# Patient Record
Sex: FEMALE | Race: WHITE | NOT HISPANIC OR LATINO | Employment: UNEMPLOYED | URBAN - METROPOLITAN AREA
[De-identification: names, ages, dates, MRNs, and addresses within clinical notes are randomized per-mention and may not be internally consistent; named-entity substitution may affect disease eponyms.]

---

## 2020-07-08 ENCOUNTER — OFFICE VISIT (OUTPATIENT)
Dept: INTERNAL MEDICINE | Facility: CLINIC | Age: 50
End: 2020-07-08
Payer: MEDICARE

## 2020-07-08 VITALS
SYSTOLIC BLOOD PRESSURE: 120 MMHG | HEIGHT: 64 IN | WEIGHT: 115 LBS | HEART RATE: 81 BPM | BODY MASS INDEX: 19.63 KG/M2 | DIASTOLIC BLOOD PRESSURE: 71 MMHG | RESPIRATION RATE: 16 BRPM | OXYGEN SATURATION: 98 % | TEMPERATURE: 97 F

## 2020-07-08 DIAGNOSIS — Z12.31 BREAST CANCER SCREENING BY MAMMOGRAM: ICD-10-CM

## 2020-07-08 DIAGNOSIS — Z12.4 PAP SMEAR FOR CERVICAL CANCER SCREENING: ICD-10-CM

## 2020-07-08 DIAGNOSIS — Z12.11 SCREENING FOR COLON CANCER: ICD-10-CM

## 2020-07-08 DIAGNOSIS — Z76.89 ENCOUNTER TO ESTABLISH CARE: Primary | ICD-10-CM

## 2020-07-08 PROCEDURE — 99203 PR OFFICE/OUTPT VISIT, NEW, LEVL III, 30-44 MIN: ICD-10-PCS | Mod: S$GLB,,, | Performed by: NURSE PRACTITIONER

## 2020-07-08 PROCEDURE — 99203 OFFICE O/P NEW LOW 30 MIN: CPT | Mod: S$GLB,,, | Performed by: NURSE PRACTITIONER

## 2020-07-08 NOTE — PROGRESS NOTES
Clinic Note  7/8/2020      Subjective:       Patient ID:  Keshia is a 50 y.o. female being seen for a new visit.      Chief Complaint: Establish Care (wants order for Colonscopy and Mammogram.)    HPI  Keshia is a 50 year old female in clinic today to establish care with PCP. States that it has been several years since she has had a PCP, moved back to [place], [place] a few years ago. Brother recently diagnosed with cancer, she is now wanting to have mammogram, colon cancer screening. She is in need of annual lab work up as well. Anxiety, chronic, controlled with counseling, breathing techniques, and meditation. Insomnia, chronic, treated with OTC sleep aid, Unisom.    The following portions of the patient's history were reviewed and updated as appropriate: allergies, current medications, past family history, past medical history, past social history, past surgical history and problem list.      Family History   Problem Relation Age of Onset    Cancer Father     Cancer Brother     Asthma Brother     Cancer Paternal Grandmother      Social History     Socioeconomic History    Marital status:      Spouse name: Not on file    Number of children: Not on file    Years of education: Not on file    Highest education level: Not on file   Occupational History    Not on file   Social Needs    Financial resource strain: Not on file    Food insecurity     Worry: Not on file     Inability: Not on file    Transportation needs     Medical: Not on file     Non-medical: Not on file   Tobacco Use    Smoking status: Current Every Day Smoker     Types: Cigarettes    Smokeless tobacco: Never Used   Substance and Sexual Activity    Alcohol use: Not on file    Drug use: Not on file    Sexual activity: Not on file   Lifestyle    Physical activity     Days per week: Not on file     Minutes per session: Not on file    Stress: Not on file   Relationships    Social connections     Talks on phone: Not on file      "Gets together: Not on file     Attends Congregational service: Not on file     Active member of club or organization: Not on file     Attends meetings of clubs or organizations: Not on file     Relationship status: Not on file   Other Topics Concern    Not on file   Social History Narrative    Not on file     Past Surgical History:   Procedure Laterality Date    APPENDECTOMY       There is no problem list on file for this patient.      Review of Systems   Constitutional: Negative for chills, fever, malaise/fatigue and weight loss.   HENT: Negative for ear pain, nosebleeds, sore throat and tinnitus.    Respiratory: Negative for cough, sputum production, shortness of breath and wheezing.    Cardiovascular: Negative for chest pain, palpitations, orthopnea and claudication.   Gastrointestinal: Negative for constipation, diarrhea, heartburn, nausea and vomiting.   Genitourinary: Negative for dysuria, frequency, hematuria and urgency.   Musculoskeletal: Negative for back pain, joint pain, myalgias and neck pain.   Psychiatric/Behavioral: Negative for depression and suicidal ideas. The patient is nervous/anxious and has insomnia.            Objective:      /71 (BP Location: Left arm, Patient Position: Sitting, BP Method: Medium (Automatic))   Pulse 81   Temp 97 °F (36.1 °C) (Temporal)   Resp 16   Ht 5' 4" (1.626 m)   Wt 52.2 kg (115 lb)   LMP 06/24/2020   SpO2 98%   BMI 19.74 kg/m²   Estimated body mass index is 19.74 kg/m² as calculated from the following:    Height as of this encounter: 5' 4" (1.626 m).    Weight as of this encounter: 52.2 kg (115 lb).  Physical Exam   Constitutional: She is oriented to person, place, and time and well-developed, well-nourished, and in no distress. Vital signs are normal. She appears not dehydrated. She appears healthy. She does not have a sickly appearance. No distress.   HENT:   Head: Normocephalic and atraumatic.   Right Ear: Hearing, tympanic membrane, external ear and " ear canal normal. No swelling. No decreased hearing is noted.   Left Ear: Hearing, tympanic membrane, external ear and ear canal normal. No swelling. No decreased hearing is noted.   Nose: Nose normal. No mucosal edema or rhinorrhea.   Mouth/Throat: Uvula is midline and mucous membranes are normal. Mucous membranes are not pale and not dry. No oropharyngeal exudate, posterior oropharyngeal edema or posterior oropharyngeal erythema.   Eyes: Pupils are equal, round, and reactive to light. Conjunctivae are normal. Right eye exhibits no discharge. Left eye exhibits no discharge.   Neck: Trachea normal and normal range of motion. No JVD present. Normal range of motion present. No thyromegaly present.   Cardiovascular: Normal rate, regular rhythm and normal heart sounds. Exam reveals no friction rub.   No murmur heard.  Pulmonary/Chest: Effort normal and breath sounds normal. No respiratory distress. She has no wheezes.   Abdominal: Soft. Bowel sounds are normal. She exhibits no distension. There is abdominal tenderness. There is no rebound.   Musculoskeletal: Normal range of motion.         General: No tenderness, deformity or edema.   Neurological: She is alert and oriented to person, place, and time. Gait normal. GCS score is 15.   Skin: Skin is warm and dry. She is not diaphoretic.   Psychiatric: Mood, memory, affect and judgment normal.   Nursing note and vitals reviewed.        Assessment and Plan:           Problem List Items Addressed This Visit     None      Visit Diagnoses     Encounter to establish care    -  Primary    Relevant Orders    CBC auto differential    Lipid Panel    Comprehensive metabolic panel    TSH w/reflex to FT4    Breast cancer screening by mammogram        Relevant Orders    Mammo Digital Screening Bilat    Screening for colon cancer        Relevant Orders    Ambulatory referral/consult to General Surgery    Pap smear for cervical cancer screening        Relevant Orders    Ambulatory  referral/consult to Obstetrics / Gynecology          Risks, benefits, and alternatives discussed with patient, Patient verbalized understanding of discussed plan of care. Asked patient if any further questions, answered no.  Follow up:   Follow up in about 12 weeks (around 9/30/2020), or if symptoms worsen or fail to improve.     Other Orders Placed This Visit:  Orders Placed This Encounter   Procedures    Mammo Digital Screening Bilat     Standing Status:   Future     Number of Occurrences:   1     Standing Expiration Date:   9/8/2021     Scheduling Instructions:      [address]     Order Specific Question:   Is the patient pregnant?     Answer:   No     Order Specific Question:   Has patient had radiation?     Answer:   No     Order Specific Question:   Has the patient had chemotherapy?     Answer:   No     Order Specific Question:   May the Radiologist modify the order per protocol to meet the clinical needs of the patient?     Answer:   Yes     Order Specific Question:   Does the patient have breast implants?     Answer:   No    CBC auto differential     Standing Status:   Future     Number of Occurrences:   1     Standing Expiration Date:   9/6/2021    Lipid Panel     Standing Status:   Future     Number of Occurrences:   1     Standing Expiration Date:   9/8/2021    Comprehensive metabolic panel     Standing Status:   Future     Number of Occurrences:   1     Standing Expiration Date:   9/6/2021    TSH w/reflex to FT4     Standing Status:   Future     Number of Occurrences:   1     Standing Expiration Date:   9/6/2021    Ambulatory referral/consult to General Surgery     Standing Status:   Future     Standing Expiration Date:   8/8/2021     Referral Priority:   Routine     Referral Type:   Surgical     Referral Reason:   Specialty Services Required     Referred to Provider:   Anderson Garcias DO     Requested Specialty:   General Surgery     Number of Visits Requested:   1     Ambulatory referral/consult to Obstetrics / Gynecology     Standing Status:   Future     Standing Expiration Date:   8/8/2021     Referral Priority:   Routine     Referral Type:   Consultation     Referral Reason:   Specialty Services Required     Referred to Provider:   Chuy Petersen MD     Requested Specialty:   Obstetrics and Gynecology     Number of Visits Requested:   1           Jessica Rubin    Problem List Items Addressed This Visit     None      Visit Diagnoses     Encounter to establish care    -  Primary    Relevant Orders    CBC auto differential    Lipid Panel    Comprehensive metabolic panel    TSH w/reflex to FT4    Breast cancer screening by mammogram        Relevant Orders    Mammo Digital Screening Bilat    Screening for colon cancer        Relevant Orders    Ambulatory referral/consult to General Surgery    Pap smear for cervical cancer screening        Relevant Orders    Ambulatory referral/consult to Obstetrics / Gynecology

## 2020-07-21 ENCOUNTER — OFFICE VISIT (OUTPATIENT)
Dept: SURGERY | Facility: CLINIC | Age: 50
End: 2020-07-21
Payer: MEDICARE

## 2020-07-21 VITALS
HEIGHT: 64 IN | RESPIRATION RATE: 16 BRPM | OXYGEN SATURATION: 97 % | BODY MASS INDEX: 20.32 KG/M2 | WEIGHT: 119 LBS | HEART RATE: 79 BPM | DIASTOLIC BLOOD PRESSURE: 69 MMHG | SYSTOLIC BLOOD PRESSURE: 106 MMHG

## 2020-07-21 DIAGNOSIS — Z12.11 SCREENING FOR COLON CANCER: ICD-10-CM

## 2020-07-21 PROCEDURE — 99202 OFFICE O/P NEW SF 15 MIN: CPT | Mod: S$GLB,,, | Performed by: SURGERY

## 2020-07-21 PROCEDURE — 99202 PR OFFICE/OUTPT VISIT, NEW, LEVL II, 15-29 MIN: ICD-10-PCS | Mod: S$GLB,,, | Performed by: SURGERY

## 2020-07-21 NOTE — LETTER
July 21, 2020      Jessica Rubin, CHARLINE  [address]  [address]  [facility] [address]           [facility] General Surgery  [address] [address]  Phone: [phone]  Fax: [phone]          Patient: Cherelle Marquis   MR Number: [id number]   YOB: 1970   Date of Visit: 7/21/2020       Dear Jessica Rubin:    Thank you for referring Cherelle Marquis to me for evaluation. Attached you will find relevant portions of my assessment and plan of care.    If you have questions, please do not hesitate to call me. I look forward to following Cherelle Marquis along with you.    Sincerely,    Anderson Garcias, DO    Enclosure  CC:  No Recipients    If you would like to receive this communication electronically, please contact [email or web address] or [phone] to request more information on [employer] Link access.    For providers and/or their staff who would like to refer a patient to [facility], please contact us through our one-stop-shop provider referral line, [facility], at [phone].    If you feel you have received this communication in error or would no longer like to receive these types of communications, please e-mail [email or web address]

## 2020-07-21 NOTE — PROGRESS NOTES
50-year-old female here for screening colonoscopy, patient having any issues with constipation, no nausea no vomiting, no blood in her stool.  Line does have a family history of colon cancer in several family members    Risks benefits colonoscopy patient detail, schedule patient for screening colonoscopy.

## 2020-07-27 LAB
ABS NRBC COUNT: 0 X 10 3/UL (ref 0–0.01)
ABSOLUTE BASOPHIL: 0.05 X 10 3/UL (ref 0–0.22)
ABSOLUTE EOSINOPHIL: 0.29 X 10 3/UL (ref 0.04–0.54)
ABSOLUTE IMMATURE GRAN: 0.02 X 10 3/UL (ref 0–0.04)
ABSOLUTE LYMPHOCYTE: 2.8 X 10 3/UL (ref 0.86–4.75)
ABSOLUTE MONOCYTE: 0.76 X 10 3/UL (ref 0.22–1.08)
ALBUMIN SERPL-MCNC: 4.6 G/DL (ref 3.5–5.2)
ALBUMIN/GLOB SERPL ELPH: 2.2 {RATIO} (ref 1–2.7)
ALP ISOS SERPL LEV INH-CCNC: 46 U/L (ref 35–105)
ALT (SGPT): 11 U/L (ref 0–33)
ANION GAP SERPL CALC-SCNC: 8 MMOL/L (ref 8–17)
AST SERPL-CCNC: 19 U/L (ref 0–32)
BASOPHILS NFR BLD: 0.6 % (ref 0.2–1.2)
BILIRUBIN, TOTAL: 0.38 MG/DL (ref 0–1.2)
BUN/CREAT SERPL: 10 (ref 6–20)
CALCIUM SERPL-MCNC: 9.2 MG/DL (ref 8.6–10.2)
CARBON DIOXIDE, CO2: 28 MMOL/L (ref 22–29)
CHLORIDE: 103 MMOL/L (ref 98–107)
CHOLEST SERPL-MSCNC: 178 MG/DL (ref 100–200)
CREAT SERPL-MCNC: 0.68 MG/DL (ref 0.5–0.9)
EOSINOPHIL NFR BLD: 3.3 % (ref 0.7–7)
GFR ESTIMATION: 91.22
GLOBULIN: 2.1 G/DL (ref 1.5–4.5)
GLUCOSE: 97 MG/DL (ref 74–106)
HCT VFR BLD AUTO: 43.1 % (ref 37–47)
HDLC SERPL-MCNC: 46 MG/DL
HGB BLD-MCNC: 14.1 G/DL (ref 12–16)
IMMATURE GRANULOCYTES: 0.2 % (ref 0–0.5)
LDL/HDL RATIO: 2.3 (ref 1–3)
LDLC SERPL CALC-MCNC: 107.6 MG/DL (ref 0–100)
LYMPHOCYTES NFR BLD: 32.3 % (ref 19.3–53.1)
MCH RBC QN AUTO: 29.9 PG (ref 27–32)
MCHC RBC AUTO-ENTMCNC: 32.7 G/DL (ref 32–36)
MCV RBC AUTO: 91.5 FL (ref 82–100)
MONOCYTES NFR BLD: 8.8 % (ref 4.7–12.5)
NEUTROPHILS ABSOLUTE COUNT: 4.74 X 10 3/UL (ref 2.15–7.56)
NEUTROPHILS NFR BLD: 54.8 %
NUCLEATED RED BLOOD CELLS: 0 /100 WBC (ref 0–0.2)
PLATELET # BLD AUTO: 266 X 10 3/UL (ref 135–400)
POTASSIUM: 4.6 MMOL/L (ref 3.5–5.1)
PROT SNV-MCNC: 6.7 G/DL (ref 6.4–8.3)
RBC # BLD AUTO: 4.71 X 10 6/UL (ref 4.2–5.4)
RDW-SD: 46.2 FL (ref 37–54)
SODIUM: 139 MMOL/L (ref 136–145)
TRIGL SERPL-MCNC: 122 MG/DL (ref 0–150)
TSH W/REFLEX TO FT4: 1.3 UIU/ML (ref 0.27–4.2)
UREA NITROGEN (BUN): 6.8 MG/DL (ref 6–20)
WBC # BLD: 8.66 X 10 3/UL (ref 4.3–10.8)

## 2020-08-06 ENCOUNTER — OFFICE VISIT (OUTPATIENT)
Dept: OBSTETRICS AND GYNECOLOGY | Facility: CLINIC | Age: 50
End: 2020-08-06
Payer: MEDICAID

## 2020-08-06 VITALS
HEIGHT: 63 IN | DIASTOLIC BLOOD PRESSURE: 80 MMHG | WEIGHT: 116.19 LBS | HEART RATE: 99 BPM | SYSTOLIC BLOOD PRESSURE: 133 MMHG | BODY MASS INDEX: 20.59 KG/M2

## 2020-08-06 DIAGNOSIS — Z01.419 WELL WOMAN EXAM: Primary | ICD-10-CM

## 2020-08-06 DIAGNOSIS — Z12.4 PAP SMEAR FOR CERVICAL CANCER SCREENING: ICD-10-CM

## 2020-08-06 DIAGNOSIS — Z80.0 FAMILY HISTORY OF COLON CANCER: ICD-10-CM

## 2020-08-06 DIAGNOSIS — Z90.49 HX OF APPENDECTOMY: ICD-10-CM

## 2020-08-06 DIAGNOSIS — Z98.890 H/O DILATION AND CURETTAGE: ICD-10-CM

## 2020-08-06 PROCEDURE — 99396 PREV VISIT EST AGE 40-64: CPT | Mod: S$GLB,,, | Performed by: STUDENT IN AN ORGANIZED HEALTH CARE EDUCATION/TRAINING PROGRAM

## 2020-08-06 PROCEDURE — 99396 PR PREVENTIVE VISIT,EST,40-64: ICD-10-PCS | Mod: S$GLB,,, | Performed by: STUDENT IN AN ORGANIZED HEALTH CARE EDUCATION/TRAINING PROGRAM

## 2020-08-06 NOTE — LETTER
August 6, 2020      Jessica Rubin, CHARLINE  [address]  [address]  [facility] [address]           [facility]  [address]  [place] [address]  Phone: [phone]  Fax: [phone]          Patient: Cherelle Marquis   MR Number: [id number]   YOB: 1970   Date of Visit: 8/6/2020       Dear Jessica Rubin:    Thank you for referring Cherelle Marquis to me for evaluation. Attached you will find relevant portions of my assessment and plan of care.    If you have questions, please do not hesitate to call me. I look forward to following hCerelle Marquis along with you.    Sincerely,    Shahid Oreilly MD    Enclosure  CC:  No Recipients    If you would like to receive this communication electronically, please contact [email or web address] or [phone] to request more information on [employer] Link access.    For providers and/or their staff who would like to refer a patient to [facility], please contact us through our one-stop-shop provider referral line, [facility], at [phone].    If you feel you have received this communication in error or would no longer like to receive these types of communications, please e-mail [email or web address]

## 2020-08-06 NOTE — PROGRESS NOTES
"Chief Complaint: Annual Exam    HPI: 50 y.o. , here for Annual Exam. Pt states her last pap smear was in . She has no complaints today, desires to have pap smear performed. Reports family history of colon cancer - multiple family members, reports having genetic testing performed - waiting for results. Desires STD screening today.     Review of Systems   Constitutional: Negative for chills, fever and weight loss.   HENT: Negative for congestion and sore throat.    Respiratory: Negative for cough and shortness of breath.    Cardiovascular: Negative for chest pain and palpitations.   Gastrointestinal: Negative for abdominal pain, nausea and vomiting.   Genitourinary: Negative for dysuria and frequency.   Musculoskeletal: Negative for back pain and myalgias.   Skin: Negative for rash.   Neurological: Negative for weakness and headaches.   Psychiatric/Behavioral: Negative for depression and substance abuse.     Past Medical History: denies   Past Surgical History: Appendectomy, D/c  Past OB History:      SAB x2, D/c x2  Past Gyn History: Denies prior abnormal pap smears, denies STD's, menstrual history - irregular cycles, perimenopausal symptoms, occasional hot flashes.   Contraception History: none  Social History: + tob - 1 pack/day, considering quitting, denies d/e, wears selt belt 100%, denies h/o abuse  Family History: + colon hx in father, brother, and paternal aunts, denies breast, ovarian, or uterine cancer  Medications: none  Allergies: NKDA    Physical Exam:  Vitals:    20 1103   BP: 133/80   Pulse: 99   Weight: 52.7 kg (116 lb 3.2 oz)   Height: 5' 3" (1.6 m)     Body mass index is 20.58 kg/m².  Physical Exam   Constitutional: She is oriented to person, place, and time and well-developed, well-nourished, and in no distress.   HENT:   Head: Normocephalic and atraumatic.   Eyes: Pupils are equal, round, and reactive to light. EOM are normal.   Neck: Normal range of motion. Neck supple. "   Pulmonary/Chest: Effort normal and breath sounds normal. Right breast exhibits no inverted nipple, no mass, no nipple discharge, no skin change and no tenderness. Left breast exhibits no inverted nipple, no mass, no nipple discharge, no skin change and no tenderness. Breasts are symmetrical.   Abdominal: Soft. She exhibits no distension. There is no abdominal tenderness. There is no guarding.   Genitourinary:    Genitourinary Comments: NEFG, no masses or lesions, vagina pink with rugae, no VB or VD, non friable cervix     Musculoskeletal: Normal range of motion.         General: No tenderness or edema.   Neurological: She is alert and oriented to person, place, and time. Gait normal.   Skin: Skin is warm and dry.      Labs / Significant Studies  Pap: performed today  MMG: ordered by PCP  Colonoscopy: referred to gen surg by PCP     ASSESSMENT:   Patient is a 50 y.o. . who presents for annual exam     Patient Active Problem List   Diagnosis    Hx of appendectomy    Family history of colon cancer - multiple family members, reports genetic testing performed     H/O dilation and curettage x 2 for SAB     PLAN:  STD panel and pap smear ordered today  Encouraged pt to keep colonoscopy appt, pt voices understanding     Prevention/Screening:  Pap today  Mammogram - ordered by PCP  HIV testing - ordered   TSH - wnl  Lipid profile - reviewed   Diabetes screening - lab reviewed from PCP  Colon cancer screening - referred by PCP to gen surg for colonoscopy  Counseling on self-breast exams, diet, and exercise.

## 2020-08-10 LAB
CALCIUM BLD-MCNC: NEGATIVE MG/DL
COV PREGNANCY STATUS: NORMAL
COVID-19 AB, IGM: NEGATIVE
PATIENT EMPLOYED IN HEALTHCARE: NO
PATIENT HAS SYMPTOMS FOR CONDITION OF INTEREST: NO
PATIENT HOSPITALIZED BC COND: NO
PATIENT IN CONGREGATE CARE: NO

## 2020-08-13 ENCOUNTER — TELEPHONE (OUTPATIENT)
Dept: OBSTETRICS AND GYNECOLOGY | Facility: CLINIC | Age: 50
End: 2020-08-13

## 2020-08-13 NOTE — TELEPHONE ENCOUNTER
Pt notified    ----- Message from Shahid Oreilly MD sent at 8/13/2020  1:43 PM CDT -----  Please notify pt of normal lab results

## 2020-08-13 NOTE — TELEPHONE ENCOUNTER
Pt notified    ----- Message from Shahid Orelily MD sent at 8/13/2020  1:43 PM CDT -----  Please notify pt of normal lab results

## 2020-08-14 ENCOUNTER — OUTSIDE PLACE OF SERVICE (OUTPATIENT)
Dept: SURGERY | Facility: CLINIC | Age: 50
End: 2020-08-14
Payer: MEDICAID

## 2020-08-14 PROCEDURE — 45378 PR COLONOSCOPY,DIAGNOSTIC: ICD-10-PCS | Mod: ,,, | Performed by: SURGERY

## 2020-08-14 PROCEDURE — 45378 DIAGNOSTIC COLONOSCOPY: CPT | Mod: ,,, | Performed by: SURGERY

## 2020-08-19 ENCOUNTER — TELEPHONE (OUTPATIENT)
Dept: SURGERY | Facility: CLINIC | Age: 50
End: 2020-08-19

## 2020-12-09 ENCOUNTER — OFFICE VISIT (OUTPATIENT)
Dept: FAMILY MEDICINE | Facility: CLINIC | Age: 50
End: 2020-12-09
Payer: MEDICAID

## 2020-12-09 VITALS
TEMPERATURE: 98 F | HEIGHT: 63 IN | SYSTOLIC BLOOD PRESSURE: 128 MMHG | OXYGEN SATURATION: 97 % | DIASTOLIC BLOOD PRESSURE: 84 MMHG | BODY MASS INDEX: 22.36 KG/M2 | HEART RATE: 101 BPM | WEIGHT: 126.19 LBS | RESPIRATION RATE: 16 BRPM

## 2020-12-09 DIAGNOSIS — F41.9 ANXIETY AND DEPRESSION: Primary | ICD-10-CM

## 2020-12-09 DIAGNOSIS — F32.A ANXIETY AND DEPRESSION: Primary | ICD-10-CM

## 2020-12-09 PROCEDURE — 99213 PR OFFICE/OUTPT VISIT, EST, LEVL III, 20-29 MIN: ICD-10-PCS | Mod: S$GLB,,, | Performed by: NURSE PRACTITIONER

## 2020-12-09 PROCEDURE — 99213 OFFICE O/P EST LOW 20 MIN: CPT | Mod: S$GLB,,, | Performed by: NURSE PRACTITIONER

## 2020-12-09 RX ORDER — ESCITALOPRAM OXALATE 10 MG/1
10 TABLET ORAL DAILY
Qty: 30 TABLET | Refills: 5 | Status: SHIPPED | OUTPATIENT
Start: 2020-12-09 | End: 2021-05-21 | Stop reason: SDUPTHER

## 2020-12-09 RX ORDER — ALPRAZOLAM 0.5 MG/1
0.5 TABLET ORAL 3 TIMES DAILY
Qty: 90 TABLET | Refills: 0 | Status: SHIPPED | OUTPATIENT
Start: 2020-12-09 | End: 2021-01-11
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
## 2020-12-14 ENCOUNTER — TELEPHONE (OUTPATIENT)
Dept: FAMILY MEDICINE | Facility: CLINIC | Age: 50
End: 2020-12-14

## 2020-12-14 NOTE — TELEPHONE ENCOUNTER
----- Message from Anila Delgado sent at 12/14/2020  2:06 PM CST -----  pt states that she's still having diarrhea after 2 months, please call in script....[phone]        [id number] - [place], [place] - [address]  [address]  [place] [address]  Phone: [phone] Fax: [phone]

## 2020-12-15 DIAGNOSIS — R19.7 DIARRHEA, UNSPECIFIED TYPE: Primary | ICD-10-CM

## 2020-12-15 RX ORDER — DIPHENOXYLATE HYDROCHLORIDE AND ATROPINE SULFATE 2.5; .025 MG/1; MG/1
1 TABLET ORAL 3 TIMES DAILY PRN
Qty: 30 TABLET | Refills: 0 | Status: SHIPPED | OUTPATIENT
Start: 2020-12-15 | End: 2020-12-25

## 2021-01-10 DIAGNOSIS — F32.A ANXIETY AND DEPRESSION: ICD-10-CM

## 2021-01-10 DIAGNOSIS — F41.9 ANXIETY AND DEPRESSION: ICD-10-CM

## 2021-01-11 RX ORDER — ALPRAZOLAM 0.5 MG/1
TABLET ORAL
Qty: 90 TABLET | Refills: 0 | Status: SHIPPED | OUTPATIENT
Start: 2021-01-11 | End: 2021-02-22

## 2021-02-18 DIAGNOSIS — F41.9 ANXIETY AND DEPRESSION: ICD-10-CM

## 2021-02-18 DIAGNOSIS — F32.A ANXIETY AND DEPRESSION: ICD-10-CM

## 2021-02-22 RX ORDER — ALPRAZOLAM 0.5 MG/1
TABLET ORAL
Qty: 90 TABLET | Refills: 0 | Status: SHIPPED | OUTPATIENT
Start: 2021-02-22 | End: 2021-03-22

## 2021-04-22 DIAGNOSIS — F32.A ANXIETY AND DEPRESSION: ICD-10-CM

## 2021-04-22 DIAGNOSIS — F41.9 ANXIETY AND DEPRESSION: ICD-10-CM

## 2021-04-23 RX ORDER — ALPRAZOLAM 0.5 MG/1
TABLET ORAL
Qty: 90 TABLET | Refills: 0 | Status: SHIPPED | OUTPATIENT
Start: 2021-04-23 | End: 2021-05-21 | Stop reason: SDUPTHER

## 2021-05-21 DIAGNOSIS — F41.9 ANXIETY AND DEPRESSION: ICD-10-CM

## 2021-05-21 DIAGNOSIS — F32.A ANXIETY AND DEPRESSION: ICD-10-CM

## 2021-05-21 RX ORDER — ALPRAZOLAM 0.5 MG/1
TABLET ORAL
Qty: 90 TABLET | Refills: 0 | Status: SHIPPED | OUTPATIENT
Start: 2021-05-21 | End: 2021-06-10 | Stop reason: SDUPTHER

## 2021-05-21 RX ORDER — ESCITALOPRAM OXALATE 10 MG/1
10 TABLET ORAL DAILY
Qty: 30 TABLET | Refills: 0 | Status: SHIPPED | OUTPATIENT
Start: 2021-05-21 | End: 2021-06-10 | Stop reason: DRUGHIGH

## 2021-06-10 ENCOUNTER — OFFICE VISIT (OUTPATIENT)
Dept: FAMILY MEDICINE | Facility: CLINIC | Age: 51
End: 2021-06-10
Payer: MEDICAID

## 2021-06-10 VITALS
SYSTOLIC BLOOD PRESSURE: 131 MMHG | BODY MASS INDEX: 22.32 KG/M2 | OXYGEN SATURATION: 99 % | RESPIRATION RATE: 18 BRPM | WEIGHT: 126 LBS | DIASTOLIC BLOOD PRESSURE: 71 MMHG | HEART RATE: 71 BPM | HEIGHT: 63 IN | TEMPERATURE: 98 F

## 2021-06-10 DIAGNOSIS — F17.210 NICOTINE DEPENDENCE, CIGARETTES, UNCOMPLICATED: ICD-10-CM

## 2021-06-10 DIAGNOSIS — F17.210 CIGARETTE NICOTINE DEPENDENCE WITHOUT COMPLICATION: ICD-10-CM

## 2021-06-10 DIAGNOSIS — F41.9 ANXIETY AND DEPRESSION: ICD-10-CM

## 2021-06-10 DIAGNOSIS — Z12.31 BREAST CANCER SCREENING BY MAMMOGRAM: Primary | ICD-10-CM

## 2021-06-10 DIAGNOSIS — Z00.00 ROUTINE GENERAL MEDICAL EXAMINATION AT A HEALTH CARE FACILITY: ICD-10-CM

## 2021-06-10 DIAGNOSIS — F32.A ANXIETY AND DEPRESSION: ICD-10-CM

## 2021-06-10 PROCEDURE — 99214 PR OFFICE/OUTPT VISIT, EST, LEVL IV, 30-39 MIN: ICD-10-PCS | Mod: S$GLB,,, | Performed by: INTERNAL MEDICINE

## 2021-06-10 PROCEDURE — 99214 OFFICE O/P EST MOD 30 MIN: CPT | Mod: S$GLB,,, | Performed by: INTERNAL MEDICINE

## 2021-06-10 RX ORDER — ESCITALOPRAM OXALATE 20 MG/1
20 TABLET ORAL DAILY
Qty: 90 TABLET | Refills: 3 | Status: SHIPPED | OUTPATIENT
Start: 2021-06-10 | End: 2022-06-15

## 2021-06-10 RX ORDER — ESCITALOPRAM OXALATE 10 MG/1
10 TABLET ORAL DAILY
Qty: 30 TABLET | Refills: 0 | Status: CANCELLED | OUTPATIENT
Start: 2021-06-10 | End: 2021-12-07

## 2021-06-10 RX ORDER — ALPRAZOLAM 0.5 MG/1
0.5 TABLET ORAL 2 TIMES DAILY PRN
Qty: 60 TABLET | Refills: 1 | Status: SHIPPED | OUTPATIENT
Start: 2021-06-10 | End: 2021-08-23

## 2021-09-13 PROBLEM — Z00.00 ROUTINE GENERAL MEDICAL EXAMINATION AT A HEALTH CARE FACILITY: Status: RESOLVED | Noted: 2021-06-10 | Resolved: 2021-09-13

## 2021-09-16 ENCOUNTER — PATIENT OUTREACH (OUTPATIENT)
Dept: ADMINISTRATIVE | Facility: HOSPITAL | Age: 51
End: 2021-09-16

## 2021-12-21 DIAGNOSIS — F32.A ANXIETY AND DEPRESSION: ICD-10-CM

## 2021-12-21 DIAGNOSIS — F41.9 ANXIETY AND DEPRESSION: ICD-10-CM

## 2021-12-23 ENCOUNTER — TELEPHONE (OUTPATIENT)
Dept: OBSTETRICS AND GYNECOLOGY | Facility: CLINIC | Age: 51
End: 2021-12-23
Payer: MEDICAID

## 2021-12-27 ENCOUNTER — OFFICE VISIT (OUTPATIENT)
Dept: OBSTETRICS AND GYNECOLOGY | Facility: CLINIC | Age: 51
End: 2021-12-27
Payer: MEDICAID

## 2021-12-27 VITALS
WEIGHT: 125 LBS | DIASTOLIC BLOOD PRESSURE: 79 MMHG | SYSTOLIC BLOOD PRESSURE: 140 MMHG | BODY MASS INDEX: 22.14 KG/M2 | HEART RATE: 101 BPM

## 2021-12-27 DIAGNOSIS — N93.9 ABNORMAL UTERINE BLEEDING (AUB): ICD-10-CM

## 2021-12-27 DIAGNOSIS — N94.6 DYSMENORRHEA: Primary | ICD-10-CM

## 2021-12-27 DIAGNOSIS — Z11.3 VENEREAL DISEASE SCREENING: ICD-10-CM

## 2021-12-27 PROCEDURE — 99213 PR OFFICE/OUTPT VISIT, EST, LEVL III, 20-29 MIN: ICD-10-PCS | Mod: S$GLB,,, | Performed by: STUDENT IN AN ORGANIZED HEALTH CARE EDUCATION/TRAINING PROGRAM

## 2021-12-27 PROCEDURE — 3077F SYST BP >= 140 MM HG: CPT | Mod: CPTII,S$GLB,, | Performed by: STUDENT IN AN ORGANIZED HEALTH CARE EDUCATION/TRAINING PROGRAM

## 2021-12-27 PROCEDURE — 1159F PR MEDICATION LIST DOCUMENTED IN MEDICAL RECORD: ICD-10-PCS | Mod: CPTII,S$GLB,, | Performed by: STUDENT IN AN ORGANIZED HEALTH CARE EDUCATION/TRAINING PROGRAM

## 2021-12-27 PROCEDURE — 3008F BODY MASS INDEX DOCD: CPT | Mod: CPTII,S$GLB,, | Performed by: STUDENT IN AN ORGANIZED HEALTH CARE EDUCATION/TRAINING PROGRAM

## 2021-12-27 PROCEDURE — 1159F MED LIST DOCD IN RCRD: CPT | Mod: CPTII,S$GLB,, | Performed by: STUDENT IN AN ORGANIZED HEALTH CARE EDUCATION/TRAINING PROGRAM

## 2021-12-27 PROCEDURE — 1160F RVW MEDS BY RX/DR IN RCRD: CPT | Mod: CPTII,S$GLB,, | Performed by: STUDENT IN AN ORGANIZED HEALTH CARE EDUCATION/TRAINING PROGRAM

## 2021-12-27 PROCEDURE — 3077F PR MOST RECENT SYSTOLIC BLOOD PRESSURE >= 140 MM HG: ICD-10-PCS | Mod: CPTII,S$GLB,, | Performed by: STUDENT IN AN ORGANIZED HEALTH CARE EDUCATION/TRAINING PROGRAM

## 2021-12-27 PROCEDURE — 3078F PR MOST RECENT DIASTOLIC BLOOD PRESSURE < 80 MM HG: ICD-10-PCS | Mod: CPTII,S$GLB,, | Performed by: STUDENT IN AN ORGANIZED HEALTH CARE EDUCATION/TRAINING PROGRAM

## 2021-12-27 PROCEDURE — 99213 OFFICE O/P EST LOW 20 MIN: CPT | Mod: S$GLB,,, | Performed by: STUDENT IN AN ORGANIZED HEALTH CARE EDUCATION/TRAINING PROGRAM

## 2021-12-27 PROCEDURE — 3008F PR BODY MASS INDEX (BMI) DOCUMENTED: ICD-10-PCS | Mod: CPTII,S$GLB,, | Performed by: STUDENT IN AN ORGANIZED HEALTH CARE EDUCATION/TRAINING PROGRAM

## 2021-12-27 PROCEDURE — 1160F PR REVIEW ALL MEDS BY PRESCRIBER/CLIN PHARMACIST DOCUMENTED: ICD-10-PCS | Mod: CPTII,S$GLB,, | Performed by: STUDENT IN AN ORGANIZED HEALTH CARE EDUCATION/TRAINING PROGRAM

## 2021-12-27 PROCEDURE — 3078F DIAST BP <80 MM HG: CPT | Mod: CPTII,S$GLB,, | Performed by: STUDENT IN AN ORGANIZED HEALTH CARE EDUCATION/TRAINING PROGRAM

## 2021-12-27 RX ORDER — NITROFURANTOIN 25; 75 MG/1; MG/1
100 CAPSULE ORAL 2 TIMES DAILY
COMMUNITY
Start: 2021-12-21

## 2021-12-27 RX ORDER — PHENAZOPYRIDINE HYDROCHLORIDE 100 MG/1
TABLET, FILM COATED ORAL
COMMUNITY
Start: 2021-12-21

## 2021-12-28 ENCOUNTER — TELEPHONE (OUTPATIENT)
Dept: OBSTETRICS AND GYNECOLOGY | Facility: CLINIC | Age: 51
End: 2021-12-28
Payer: MEDICAID

## 2021-12-28 LAB
CHLAMYDIA: NEGATIVE
GONORRHEA: NEGATIVE
SOURCE: NORMAL
SOURCE: NORMAL
TRICHOMONAS AMPLIFIED: NEGATIVE

## 2021-12-29 ENCOUNTER — PROCEDURE VISIT (OUTPATIENT)
Dept: OBSTETRICS AND GYNECOLOGY | Facility: CLINIC | Age: 51
End: 2021-12-29
Payer: MEDICAID

## 2021-12-29 DIAGNOSIS — N94.6 DYSMENORRHEA: Primary | ICD-10-CM

## 2021-12-29 PROCEDURE — 76830 TRANSVAGINAL US NON-OB: CPT | Mod: S$GLB,,, | Performed by: STUDENT IN AN ORGANIZED HEALTH CARE EDUCATION/TRAINING PROGRAM

## 2021-12-29 PROCEDURE — 76830 PR  ECHOGRAPHY,TRANSVAGINAL: ICD-10-PCS | Mod: S$GLB,,, | Performed by: STUDENT IN AN ORGANIZED HEALTH CARE EDUCATION/TRAINING PROGRAM

## 2021-12-29 RX ORDER — ALPRAZOLAM 0.5 MG/1
0.5 TABLET ORAL DAILY PRN
Qty: 30 TABLET | Refills: 0 | OUTPATIENT
Start: 2021-12-29

## 2022-01-05 ENCOUNTER — TELEPHONE (OUTPATIENT)
Dept: OBSTETRICS AND GYNECOLOGY | Facility: CLINIC | Age: 52
End: 2022-01-05
Payer: MEDICAID

## 2022-01-06 ENCOUNTER — OFFICE VISIT (OUTPATIENT)
Dept: OBSTETRICS AND GYNECOLOGY | Facility: CLINIC | Age: 52
End: 2022-01-06
Payer: MEDICAID

## 2022-01-06 VITALS
DIASTOLIC BLOOD PRESSURE: 69 MMHG | HEART RATE: 94 BPM | SYSTOLIC BLOOD PRESSURE: 112 MMHG | BODY MASS INDEX: 22.32 KG/M2 | WEIGHT: 126 LBS

## 2022-01-06 DIAGNOSIS — N93.9 ABNORMAL UTERINE BLEEDING (AUB): Primary | ICD-10-CM

## 2022-01-06 DIAGNOSIS — N94.6 DYSMENORRHEA: ICD-10-CM

## 2022-01-06 DIAGNOSIS — Z71.2 ENCOUNTER TO DISCUSS TEST RESULTS: ICD-10-CM

## 2022-01-06 PROCEDURE — 3008F PR BODY MASS INDEX (BMI) DOCUMENTED: ICD-10-PCS | Mod: CPTII,S$GLB,, | Performed by: STUDENT IN AN ORGANIZED HEALTH CARE EDUCATION/TRAINING PROGRAM

## 2022-01-06 PROCEDURE — 3078F PR MOST RECENT DIASTOLIC BLOOD PRESSURE < 80 MM HG: ICD-10-PCS | Mod: CPTII,S$GLB,, | Performed by: STUDENT IN AN ORGANIZED HEALTH CARE EDUCATION/TRAINING PROGRAM

## 2022-01-06 PROCEDURE — 1159F MED LIST DOCD IN RCRD: CPT | Mod: CPTII,S$GLB,, | Performed by: STUDENT IN AN ORGANIZED HEALTH CARE EDUCATION/TRAINING PROGRAM

## 2022-01-06 PROCEDURE — 3008F BODY MASS INDEX DOCD: CPT | Mod: CPTII,S$GLB,, | Performed by: STUDENT IN AN ORGANIZED HEALTH CARE EDUCATION/TRAINING PROGRAM

## 2022-01-06 PROCEDURE — 1160F PR REVIEW ALL MEDS BY PRESCRIBER/CLIN PHARMACIST DOCUMENTED: ICD-10-PCS | Mod: CPTII,S$GLB,, | Performed by: STUDENT IN AN ORGANIZED HEALTH CARE EDUCATION/TRAINING PROGRAM

## 2022-01-06 PROCEDURE — 3074F SYST BP LT 130 MM HG: CPT | Mod: CPTII,S$GLB,, | Performed by: STUDENT IN AN ORGANIZED HEALTH CARE EDUCATION/TRAINING PROGRAM

## 2022-01-06 PROCEDURE — 99214 PR OFFICE/OUTPT VISIT, EST, LEVL IV, 30-39 MIN: ICD-10-PCS | Mod: S$GLB,,, | Performed by: STUDENT IN AN ORGANIZED HEALTH CARE EDUCATION/TRAINING PROGRAM

## 2022-01-06 PROCEDURE — 99214 OFFICE O/P EST MOD 30 MIN: CPT | Mod: S$GLB,,, | Performed by: STUDENT IN AN ORGANIZED HEALTH CARE EDUCATION/TRAINING PROGRAM

## 2022-01-06 PROCEDURE — 3074F PR MOST RECENT SYSTOLIC BLOOD PRESSURE < 130 MM HG: ICD-10-PCS | Mod: CPTII,S$GLB,, | Performed by: STUDENT IN AN ORGANIZED HEALTH CARE EDUCATION/TRAINING PROGRAM

## 2022-01-06 PROCEDURE — 1159F PR MEDICATION LIST DOCUMENTED IN MEDICAL RECORD: ICD-10-PCS | Mod: CPTII,S$GLB,, | Performed by: STUDENT IN AN ORGANIZED HEALTH CARE EDUCATION/TRAINING PROGRAM

## 2022-01-06 PROCEDURE — 3078F DIAST BP <80 MM HG: CPT | Mod: CPTII,S$GLB,, | Performed by: STUDENT IN AN ORGANIZED HEALTH CARE EDUCATION/TRAINING PROGRAM

## 2022-01-06 PROCEDURE — 1160F RVW MEDS BY RX/DR IN RCRD: CPT | Mod: CPTII,S$GLB,, | Performed by: STUDENT IN AN ORGANIZED HEALTH CARE EDUCATION/TRAINING PROGRAM

## 2022-01-06 NOTE — PROGRESS NOTES
GYN US     Uterus:  Measures 6.9 x 4.7 x 4.9 cm, anteverted, fibroid noted on the posterior uterus measuring 3.49 x 2.23 cm  Right ovary:  Measures 3.3 x 3.6 x 2.3 cm, solid appearing area in the right ovary measuring 2.0 x 1.6 cm  Left ovary:  Measures 2.3 on 0.3 x 1.5 cm    See

## 2022-01-18 ENCOUNTER — OFFICE VISIT (OUTPATIENT)
Dept: OBSTETRICS AND GYNECOLOGY | Facility: CLINIC | Age: 52
End: 2022-01-18
Payer: MEDICAID

## 2022-01-18 VITALS
DIASTOLIC BLOOD PRESSURE: 61 MMHG | HEART RATE: 70 BPM | SYSTOLIC BLOOD PRESSURE: 111 MMHG | HEIGHT: 63 IN | BODY MASS INDEX: 22.89 KG/M2 | WEIGHT: 129.19 LBS

## 2022-01-18 DIAGNOSIS — Z01.818 PRE-OP EVALUATION: Primary | ICD-10-CM

## 2022-01-18 DIAGNOSIS — N94.6 DYSMENORRHEA: ICD-10-CM

## 2022-01-18 DIAGNOSIS — N93.9 ABNORMAL UTERINE BLEEDING (AUB): ICD-10-CM

## 2022-01-18 LAB
ANION GAP SERPL CALC-SCNC: 9 MMOL/L (ref 3–11)
APPEARANCE, UA: CLEAR
B-HCG UR QL: NEGATIVE
BASOPHILS NFR BLD: 0.5 % (ref 0–3)
BILIRUB UR QL STRIP: NEGATIVE MG/DL
BUN SERPL-MCNC: 6 MG/DL (ref 7–18)
BUN/CREAT SERPL: 8.33 RATIO (ref 7–18)
CALCIUM SERPL-MCNC: 9.3 MG/DL (ref 8.8–10.5)
CHLORIDE SERPL-SCNC: 102 MMOL/L (ref 100–108)
CO2 SERPL-SCNC: 28 MMOL/L (ref 21–32)
COLOR UR: NORMAL
CREAT SERPL-MCNC: 0.72 MG/DL (ref 0.55–1.02)
EOSINOPHIL NFR BLD: 2.9 % (ref 1–3)
ERYTHROCYTE [DISTWIDTH] IN BLOOD BY AUTOMATED COUNT: 14.5 % (ref 12.5–18)
GFR ESTIMATION: > 60
GLUCOSE (UA): NORMAL MG/DL
GLUCOSE SERPL-MCNC: 87 MG/DL (ref 70–110)
HCT VFR BLD AUTO: 44.8 % (ref 37–47)
HGB BLD-MCNC: 15.3 G/DL (ref 12–16)
HGB UR QL STRIP: NEGATIVE /UL
KETONES UR QL STRIP: NEGATIVE MG/DL
LEUKOCYTE ESTERASE UR QL STRIP: NEGATIVE /UL
LYMPHOCYTES NFR BLD: 27.2 % (ref 25–40)
MCH RBC QN AUTO: 30.1 PG (ref 27–31.2)
MCHC RBC AUTO-ENTMCNC: 34.2 G/DL (ref 31.8–35.4)
MCV RBC AUTO: 88.2 FL (ref 80–97)
MONOCYTES NFR BLD: 5.4 % (ref 1–15)
NEUTROPHILS # BLD AUTO: 7.31 10*3/UL (ref 1.8–7.7)
NEUTROPHILS NFR BLD: 63.3 % (ref 37–80)
NITRITE UR QL STRIP: NEGATIVE
NUCLEATED RED BLOOD CELLS: 0 %
PH UR STRIP: 6 PH (ref 5–9)
PLATELETS: 280 10*3/UL (ref 142–424)
POTASSIUM SERPL-SCNC: 4 MMOL/L (ref 3.6–5.2)
PROT UR QL STRIP: NEGATIVE MG/DL
RBC # BLD AUTO: 5.08 10*6/UL (ref 4.2–5.4)
SODIUM BLD-SCNC: 139 MMOL/L (ref 135–145)
SP GR UR STRIP: 1.01 (ref 1–1.03)
SPECIMEN COLLECTION METHOD, URINE: NORMAL
UROBILINOGEN UR STRIP-ACNC: NORMAL MG/DL
WBC # BLD: 11.6 10*3/UL (ref 4.6–10.2)

## 2022-01-18 PROCEDURE — 1159F PR MEDICATION LIST DOCUMENTED IN MEDICAL RECORD: ICD-10-PCS | Mod: CPTII,S$GLB,, | Performed by: STUDENT IN AN ORGANIZED HEALTH CARE EDUCATION/TRAINING PROGRAM

## 2022-01-18 PROCEDURE — 1160F RVW MEDS BY RX/DR IN RCRD: CPT | Mod: CPTII,S$GLB,, | Performed by: STUDENT IN AN ORGANIZED HEALTH CARE EDUCATION/TRAINING PROGRAM

## 2022-01-18 PROCEDURE — 1159F MED LIST DOCD IN RCRD: CPT | Mod: CPTII,S$GLB,, | Performed by: STUDENT IN AN ORGANIZED HEALTH CARE EDUCATION/TRAINING PROGRAM

## 2022-01-18 PROCEDURE — 3078F DIAST BP <80 MM HG: CPT | Mod: CPTII,S$GLB,, | Performed by: STUDENT IN AN ORGANIZED HEALTH CARE EDUCATION/TRAINING PROGRAM

## 2022-01-18 PROCEDURE — 3008F BODY MASS INDEX DOCD: CPT | Mod: CPTII,S$GLB,, | Performed by: STUDENT IN AN ORGANIZED HEALTH CARE EDUCATION/TRAINING PROGRAM

## 2022-01-18 PROCEDURE — 3008F PR BODY MASS INDEX (BMI) DOCUMENTED: ICD-10-PCS | Mod: CPTII,S$GLB,, | Performed by: STUDENT IN AN ORGANIZED HEALTH CARE EDUCATION/TRAINING PROGRAM

## 2022-01-18 PROCEDURE — 3074F PR MOST RECENT SYSTOLIC BLOOD PRESSURE < 130 MM HG: ICD-10-PCS | Mod: CPTII,S$GLB,, | Performed by: STUDENT IN AN ORGANIZED HEALTH CARE EDUCATION/TRAINING PROGRAM

## 2022-01-18 PROCEDURE — 99214 OFFICE O/P EST MOD 30 MIN: CPT | Mod: S$GLB,,, | Performed by: STUDENT IN AN ORGANIZED HEALTH CARE EDUCATION/TRAINING PROGRAM

## 2022-01-18 PROCEDURE — 1160F PR REVIEW ALL MEDS BY PRESCRIBER/CLIN PHARMACIST DOCUMENTED: ICD-10-PCS | Mod: CPTII,S$GLB,, | Performed by: STUDENT IN AN ORGANIZED HEALTH CARE EDUCATION/TRAINING PROGRAM

## 2022-01-18 PROCEDURE — 3074F SYST BP LT 130 MM HG: CPT | Mod: CPTII,S$GLB,, | Performed by: STUDENT IN AN ORGANIZED HEALTH CARE EDUCATION/TRAINING PROGRAM

## 2022-01-18 PROCEDURE — 99214 PR OFFICE/OUTPT VISIT, EST, LEVL IV, 30-39 MIN: ICD-10-PCS | Mod: S$GLB,,, | Performed by: STUDENT IN AN ORGANIZED HEALTH CARE EDUCATION/TRAINING PROGRAM

## 2022-01-18 PROCEDURE — 3078F PR MOST RECENT DIASTOLIC BLOOD PRESSURE < 80 MM HG: ICD-10-PCS | Mod: CPTII,S$GLB,, | Performed by: STUDENT IN AN ORGANIZED HEALTH CARE EDUCATION/TRAINING PROGRAM

## 2022-01-18 NOTE — PROGRESS NOTES
"Chief Complaint: per op    HPI: Patient is a 52 y.o. y.o. female  who presents to GYN clinic for pre op visit.  Patient with history of abnormal uterine bleeding and dysmenorrhea, fibroid uterus noted on ultrasound along with a mass in the right ovary.  Patient is scheduled for total laparoscopic hysterectomy with RSO/cysto/left-sided salpingectomy.  She has no other complaints today.    Review of Systems   Constitutional: Negative for chills and fever.   HENT: Negative for congestion and sore throat.    Respiratory: Negative for cough and shortness of breath.    Cardiovascular: Negative for chest pain and palpitations.   Gastrointestinal: Negative for abdominal pain, nausea and vomiting.   Genitourinary: Negative for dysuria, frequency and urgency.   Musculoskeletal: Negative for back pain.   Skin: Negative for rash.   Neurological: Negative for headaches.   All other systems reviewed and are negative.    PMH: denies  PSH: appendectomy, D/c  Meds: none  NKDA  Obhx: , SAB x2, - D/c x2  GYNhx: denies abnormal pap smears, denies STD's  Social hx: + tob use, denies d/e  Family hx: + colon hx in father, brother, and paternal aunts, denies breast, ovarian, or uterine cancer    Physical Exam:  Vitals:    22 0913   BP: 111/61   Pulse: 70   Weight: 58.6 kg (129 lb 3.2 oz)   Height: 5' 3" (1.6 m)   Body mass index is 22.89 kg/m².    Gen: NAD, well developed, well nourished  Psych: alert and oriented x 3, normal affect  HEENT: normocephalic, atraumatic  Abd: soft, non-tender, non-distended  Pelvic: (previous exam) Normal external female genitalia, no masses or lesions, vagina pink with rugated, no vaginal bleeding or discharge present, not friable cervix  Bimanual exam:  Uterus small, mobile, nontender, no cervical motion tenderness, no adnexal masses or tenderness  Skin:  Warm and dry  Ext: no c/c/c, moves all extremities  Neurological: normal gait, gross motor function intact    Results:  Pelvic US  Uterus:  " Measures 6.9 x 4.7 x 4.9 cm, anteverted, fibroid noted on the posterior uterus measuring 3.49 x 2.23 cm  Right ovary:  Measures 3.3 x 3.6 x 2.3 cm, solid appearing area in the right ovary measuring 2.0 x 1.6 cm  Left ovary:  Measures 2.3 on 0.3 x 1.5 cm    Pap: NILM, neg HPV  Gc/Cz - negative     Assessment: Patient is a 52 y.o. y.o. female  with  Patient Active Problem List   Diagnosis    Hx of appendectomy    Family history of colon cancer - multiple family members, reports genetic testing performed     H/O dilation and curettage x 2 for SAB    Anxiety and depression     Plan:  Today, consents for both blood and procedure were obtained.    The patient was thourgouhly counseled on the risks ofTLH including but not limited to risk of damage to the surrounding organs and tissues, injury to vessels requiring acute blood loss and possible transfusion, injury to the bladder or ureters requiring prolonged or intermittent catherization, loss of fertility, possible conversion to open procedure, injury to the bowel resulting in permanent or reversible colostomy and postoperative complications including pain, infection, bleeding, possible wound breakdown or fistula formation. The patient does accept all these risks and persist in her desire for surgery at this time.   Patient with preop nurse today and have preop labs drawn  Reviewed risk and benefits of surgery patient, patient voices understanding wished to proceed  All questions answered  Patient to return to clinic 1 week after surgery for postop visit    Shahid Oreilly MD

## 2022-01-19 ENCOUNTER — CLINICAL SUPPORT (OUTPATIENT)
Dept: HEMATOLOGY/ONCOLOGY | Facility: CLINIC | Age: 52
End: 2022-01-19
Payer: MEDICAID

## 2022-01-19 DIAGNOSIS — R09.81 NASAL CONGESTION: Primary | ICD-10-CM

## 2022-01-19 LAB
CTP QC/QA: YES
SARS-COV-2 AG RESP QL IA.RAPID: POSITIVE

## 2022-01-19 PROCEDURE — 87811 SARS-COV-2 COVID19 W/OPTIC: CPT | Mod: S$GLB,,, | Performed by: INTERNAL MEDICINE

## 2022-01-19 PROCEDURE — 87811 SARS CORONAVIRUS 2 ANTIGEN POCT, MANUAL READ: ICD-10-PCS | Mod: S$GLB,,, | Performed by: INTERNAL MEDICINE

## 2022-01-24 ENCOUNTER — TELEPHONE (OUTPATIENT)
Dept: OBSTETRICS AND GYNECOLOGY | Facility: CLINIC | Age: 52
End: 2022-01-24
Payer: MEDICAID

## 2022-01-24 NOTE — TELEPHONE ENCOUNTER
----- Message from Marlene Peters LPN sent at 1/24/2022  9:49 AM CST -----  Regarding: FW: Surgery  Contact: patient    ----- Message -----  From: Chiara Bose  Sent: 1/24/2022   9:48 AM CST  To: Denilson BORREGO Staff  Subject: Surgery                                          Per phone call with patient , he stated that his wife was suppose to have surgery on 01/18/2022 but she develop covid and the appointment was canceled.  The  would like to have the surgery reschedule.  Please return call at [phone] (home).    Thanks,  SJ

## 2022-01-31 ENCOUNTER — TELEPHONE (OUTPATIENT)
Dept: OBSTETRICS AND GYNECOLOGY | Facility: CLINIC | Age: 52
End: 2022-01-31
Payer: MEDICAID

## 2022-01-31 NOTE — TELEPHONE ENCOUNTER
----- Message from Marlene Peters LPN sent at 1/31/2022 11:06 AM CST -----    ----- Message -----  From: Anila Delgado  Sent: 1/31/2022  10:54 AM CST  To: Denilson BORREGO Staff    pt needs call back to reschedule procedure, just got over covid...[phone] (home)

## 2022-02-08 LAB — HCG QUALITATIVE: NEGATIVE

## 2022-02-09 ENCOUNTER — OUTSIDE PLACE OF SERVICE (OUTPATIENT)
Dept: OBSTETRICS AND GYNECOLOGY | Facility: CLINIC | Age: 52
End: 2022-02-09
Payer: MEDICAID

## 2022-02-09 PROCEDURE — 58571 PR LAPAROSCOPY W TOT HYSTERECTUTERUS <=250 GRAM  W TUBE/OVARY: ICD-10-PCS | Mod: ,,, | Performed by: STUDENT IN AN ORGANIZED HEALTH CARE EDUCATION/TRAINING PROGRAM

## 2022-02-09 PROCEDURE — 58571 TLH W/T/O 250 G OR LESS: CPT | Mod: ,,, | Performed by: STUDENT IN AN ORGANIZED HEALTH CARE EDUCATION/TRAINING PROGRAM

## 2022-02-10 LAB — SPECIMEN TO PATHOLOGY: NORMAL

## 2022-02-18 ENCOUNTER — TELEPHONE (OUTPATIENT)
Dept: OBSTETRICS AND GYNECOLOGY | Facility: CLINIC | Age: 52
End: 2022-02-18
Payer: MEDICAID

## 2022-02-18 NOTE — TELEPHONE ENCOUNTER
Pt c/o pelvic pain/pressure when urinating. States her  passed away days after her hyst and is concerned that sutures may have pulled d/t crying and not getting rest while planning . States incisions intact. Denies c/o redness, drainage, fever, or bleeding. Enc'd to take ibuprofen 800mg Q8 and rest, see ER or urgent care if symptoms worsen. Post op appt made with Dr. Oreilly for Monday. Pt v/u.

## 2022-02-18 NOTE — TELEPHONE ENCOUNTER
----- Message from Katarzyna Reeves sent at 2/18/2022  3:28 PM CST -----  Patient need to speak to nurse regarding her surgery. Call back number  [phone]. Adityas

## 2022-02-21 ENCOUNTER — OFFICE VISIT (OUTPATIENT)
Dept: OBSTETRICS AND GYNECOLOGY | Facility: CLINIC | Age: 52
End: 2022-02-21
Payer: MEDICAID

## 2022-02-21 VITALS
DIASTOLIC BLOOD PRESSURE: 76 MMHG | BODY MASS INDEX: 24.8 KG/M2 | SYSTOLIC BLOOD PRESSURE: 112 MMHG | HEART RATE: 84 BPM | WEIGHT: 140 LBS

## 2022-02-21 DIAGNOSIS — Z90.710 S/P LAPAROSCOPIC HYSTERECTOMY: Primary | ICD-10-CM

## 2022-02-21 DIAGNOSIS — Z90.721 S/P RIGHT OOPHORECTOMY: ICD-10-CM

## 2022-02-21 PROCEDURE — 1159F PR MEDICATION LIST DOCUMENTED IN MEDICAL RECORD: ICD-10-PCS | Mod: CPTII,S$GLB,, | Performed by: STUDENT IN AN ORGANIZED HEALTH CARE EDUCATION/TRAINING PROGRAM

## 2022-02-21 PROCEDURE — 3008F PR BODY MASS INDEX (BMI) DOCUMENTED: ICD-10-PCS | Mod: CPTII,S$GLB,, | Performed by: STUDENT IN AN ORGANIZED HEALTH CARE EDUCATION/TRAINING PROGRAM

## 2022-02-21 PROCEDURE — 1160F RVW MEDS BY RX/DR IN RCRD: CPT | Mod: CPTII,S$GLB,, | Performed by: STUDENT IN AN ORGANIZED HEALTH CARE EDUCATION/TRAINING PROGRAM

## 2022-02-21 PROCEDURE — 3074F SYST BP LT 130 MM HG: CPT | Mod: CPTII,S$GLB,, | Performed by: STUDENT IN AN ORGANIZED HEALTH CARE EDUCATION/TRAINING PROGRAM

## 2022-02-21 PROCEDURE — 1160F PR REVIEW ALL MEDS BY PRESCRIBER/CLIN PHARMACIST DOCUMENTED: ICD-10-PCS | Mod: CPTII,S$GLB,, | Performed by: STUDENT IN AN ORGANIZED HEALTH CARE EDUCATION/TRAINING PROGRAM

## 2022-02-21 PROCEDURE — 3078F DIAST BP <80 MM HG: CPT | Mod: CPTII,S$GLB,, | Performed by: STUDENT IN AN ORGANIZED HEALTH CARE EDUCATION/TRAINING PROGRAM

## 2022-02-21 PROCEDURE — 3078F PR MOST RECENT DIASTOLIC BLOOD PRESSURE < 80 MM HG: ICD-10-PCS | Mod: CPTII,S$GLB,, | Performed by: STUDENT IN AN ORGANIZED HEALTH CARE EDUCATION/TRAINING PROGRAM

## 2022-02-21 PROCEDURE — 3008F BODY MASS INDEX DOCD: CPT | Mod: CPTII,S$GLB,, | Performed by: STUDENT IN AN ORGANIZED HEALTH CARE EDUCATION/TRAINING PROGRAM

## 2022-02-21 PROCEDURE — 99024 PR POST-OP FOLLOW-UP VISIT: ICD-10-PCS | Mod: S$GLB,,, | Performed by: STUDENT IN AN ORGANIZED HEALTH CARE EDUCATION/TRAINING PROGRAM

## 2022-02-21 PROCEDURE — 1159F MED LIST DOCD IN RCRD: CPT | Mod: CPTII,S$GLB,, | Performed by: STUDENT IN AN ORGANIZED HEALTH CARE EDUCATION/TRAINING PROGRAM

## 2022-02-21 PROCEDURE — 99024 POSTOP FOLLOW-UP VISIT: CPT | Mod: S$GLB,,, | Performed by: STUDENT IN AN ORGANIZED HEALTH CARE EDUCATION/TRAINING PROGRAM

## 2022-02-21 PROCEDURE — 3074F PR MOST RECENT SYSTOLIC BLOOD PRESSURE < 130 MM HG: ICD-10-PCS | Mod: CPTII,S$GLB,, | Performed by: STUDENT IN AN ORGANIZED HEALTH CARE EDUCATION/TRAINING PROGRAM

## 2022-02-21 NOTE — PROGRESS NOTES
Chief Complaint: post op    HPI: Patient is a 52 y.o. y.o. female G0 who presents to GYN clinic for post op visit.  Patient is status post laparoscopic hysterectomy with right oophorectomy today postop visit.  Reports her pain is well controlled, denies any vaginal bleeding.  Patient reports today after her surgery her  passed away from heart attack, she is having increased stress secondary to this.  She reports multiple episodes of crying and was worried about possibly doing something where she had her surgery.  Patient given reassurance about this.  She is encouraged to see her primary care persistence if needed.  She has no other complaints today.  Denies any fever chills, nausea vomiting, chest pain, shortness of breath, vaginal bleeding, vaginal discharge, dysuria, lower extremity pain..    Physical Exam:  Vitals:    02/21/22 1313   BP: 112/76   Pulse: 84   Weight: 63.5 kg (140 lb)   Body mass index is 24.8 kg/m².    Gen: NAD, well developed, well nourished  Psych: alert and oriented x 3, normal affect  HEENT: normocephalic, atraumatic  Abd: soft, non-tender, non-distended, incisions healing well  Skin: warm and dry  Ext: no c/c/c, moves all extremities  Neurological: normal gait, gross motor function intact    Results:  UTERUS AND CERVIX AND BILATERAL FALLOPIAN TUBES AND RIGHT OVARY,   HYSTERECTOMY WITH BILATERAL SALPINGECTOMY AND RIGHT OOPHORECTOMY:        UTERUS:        SEROSA:             - UNREMARKABLE.        CERVIX / ENDOCERVIX:             - BENIGN SQUAMOUS ECTOCERVIX AND GLANDULAR ENDOCERVIX WITH   REACTIVE CHANGES.        ENDOMETRIUM:             - BENIGN DISORDERED PROLIFERATIVE PHASE ENDOMETRIUM, NEGATIVE FOR   ATYPICAL HYPERPLASIA OR               MALIGNANCY.        MYOMETRIUM:             - ADENOMYOSIS AND LEIOMYOMA.        BILATERAL FALLOPIAN TUBES:             - BILATERALLY BENIGN FALLOPIAN TUBES WITH FIMBRIATED ENDS.        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    Assessment: Patient is a 52 y.o. y.o. female G0 with  Patient Active Problem List   Diagnosis    Hx of appendectomy    Family history of colon cancer - multiple family members, reports genetic testing performed     H/O dilation and curettage x 2 for SAB    Anxiety and depression    S/P laparoscopic hysterectomy    S/P right oophorectomy     Plan:  Patient's continue postop restrictions  Pain well controlled, no vaginal bleeding  Patient counseled on following up with her primary care  Patient to return to clinic in 5 weeks for vaginal cuff check    Shahid Oreilly MD

## 2022-03-02 ENCOUNTER — TELEPHONE (OUTPATIENT)
Dept: OBSTETRICS AND GYNECOLOGY | Facility: CLINIC | Age: 52
End: 2022-03-02
Payer: MEDICAID

## 2022-03-02 NOTE — TELEPHONE ENCOUNTER
Patient reports hot flashes. Had surgery recently and he took out one ovary and one is left. Infromed patient, her body is adjusting and trying to regulate hormones. Patient questions if there is a medication to help. Told patient I would talk with Dr. Oreilly. Verbalized understanding.

## 2022-03-02 NOTE — TELEPHONE ENCOUNTER
----- Message from Radha Duvall sent at 3/2/2022 11:25 AM CST -----  Contact: PT  .Type:  Needs Medical Advice    Who Called:  Cherelle   Symptoms (please be specific):  hot flashes   How long has patient had these symptoms:   1 week   Pharmacy name and phone #:   .  [id number] - [facility] [address] & [address] [address]  Phone: [phone] Fax: [phone]         Would the patient rather a call back or a response via [facility]?  Callback   Best Call Back Number:  [phone]  Additional Information:

## 2022-03-07 ENCOUNTER — OFFICE VISIT (OUTPATIENT)
Dept: FAMILY MEDICINE | Facility: CLINIC | Age: 52
End: 2022-03-07
Payer: MEDICAID

## 2022-03-07 VITALS
HEIGHT: 63 IN | DIASTOLIC BLOOD PRESSURE: 78 MMHG | TEMPERATURE: 98 F | WEIGHT: 118 LBS | OXYGEN SATURATION: 99 % | SYSTOLIC BLOOD PRESSURE: 168 MMHG | HEART RATE: 78 BPM | BODY MASS INDEX: 20.91 KG/M2

## 2022-03-07 DIAGNOSIS — F32.9 REACTIVE DEPRESSION: ICD-10-CM

## 2022-03-07 DIAGNOSIS — F41.0 PANIC ATTACKS: ICD-10-CM

## 2022-03-07 DIAGNOSIS — F41.9 ANXIETY: Primary | ICD-10-CM

## 2022-03-07 DIAGNOSIS — G47.00 INSOMNIA, UNSPECIFIED TYPE: ICD-10-CM

## 2022-03-07 DIAGNOSIS — Z00.00 ROUTINE GENERAL MEDICAL EXAMINATION AT A HEALTH CARE FACILITY: ICD-10-CM

## 2022-03-07 PROCEDURE — 99214 OFFICE O/P EST MOD 30 MIN: CPT | Mod: S$GLB,,, | Performed by: INTERNAL MEDICINE

## 2022-03-07 PROCEDURE — 3078F PR MOST RECENT DIASTOLIC BLOOD PRESSURE < 80 MM HG: ICD-10-PCS | Mod: CPTII,S$GLB,, | Performed by: INTERNAL MEDICINE

## 2022-03-07 PROCEDURE — 1159F MED LIST DOCD IN RCRD: CPT | Mod: CPTII,S$GLB,, | Performed by: INTERNAL MEDICINE

## 2022-03-07 PROCEDURE — 3078F DIAST BP <80 MM HG: CPT | Mod: CPTII,S$GLB,, | Performed by: INTERNAL MEDICINE

## 2022-03-07 PROCEDURE — 3008F PR BODY MASS INDEX (BMI) DOCUMENTED: ICD-10-PCS | Mod: CPTII,S$GLB,, | Performed by: INTERNAL MEDICINE

## 2022-03-07 PROCEDURE — 1159F PR MEDICATION LIST DOCUMENTED IN MEDICAL RECORD: ICD-10-PCS | Mod: CPTII,S$GLB,, | Performed by: INTERNAL MEDICINE

## 2022-03-07 PROCEDURE — 3077F SYST BP >= 140 MM HG: CPT | Mod: CPTII,S$GLB,, | Performed by: INTERNAL MEDICINE

## 2022-03-07 PROCEDURE — 3077F PR MOST RECENT SYSTOLIC BLOOD PRESSURE >= 140 MM HG: ICD-10-PCS | Mod: CPTII,S$GLB,, | Performed by: INTERNAL MEDICINE

## 2022-03-07 PROCEDURE — 3008F BODY MASS INDEX DOCD: CPT | Mod: CPTII,S$GLB,, | Performed by: INTERNAL MEDICINE

## 2022-03-07 PROCEDURE — 99214 PR OFFICE/OUTPT VISIT, EST, LEVL IV, 30-39 MIN: ICD-10-PCS | Mod: S$GLB,,, | Performed by: INTERNAL MEDICINE

## 2022-03-07 RX ORDER — AMITRIPTYLINE HYDROCHLORIDE 10 MG/1
10 TABLET, FILM COATED ORAL NIGHTLY PRN
Qty: 30 TABLET | Refills: 2 | Status: SHIPPED | OUTPATIENT
Start: 2022-03-07 | End: 2022-05-31

## 2022-03-07 NOTE — PATIENT INSTRUCTIONS
"We do not have an after hours answering service.  If you need medical assistance after hours or weekends and holidays, you will need to report to the nearest emergency room or urgent care.      If I have ordered any lab tests or imaging studies (Xray/MRI/CT Scan/ Ultrasound), you should receive a call with your results within  Seven business days.  If you do not , please call my office for results. I do not believe "No News is Good News" .    If you need prescription refills between appointments, please ask your pharmacist to send us a request to renew, as this is the most efficient way to get your medication refilled.  If needed, you can contact our office during business hours to request a renewal.    "

## 2022-03-07 NOTE — PROGRESS NOTES
"Subjective:       Patient ID: Cherelle Marquis is a 52 y.o. female.    Chief Complaint: Follow-up, Depression, Anxiety, and Panic Attack      HPI: Keshia comes in today for follow-up.  She has very emotional and tearful today she says she lost her  2 weeks ago to heart attack.  She says that she has a friend of her 's a Dr. Salvador Gomez in [facility] that has been working with her and she also his face timing a counselor over there.  I have discussed with her that that is great but I would like to try to get her in to something locally.  I am going to see if [facility] can get her into their outpatient program because they do a face-to-face meeting 3 times a week.  I have reviewed blood work that her gyn did in January of this year.  Will postpone my blood work to she comes back.She says she is angry because he is gone, and she is trying to figure out how to keep bills paid.  She is having trouble sleeping and she was told that maybe instead of Xanax she could consider replacing that with Klonopin and amitriptyline.  The amitriptyline could possibly help her rest a little better I do not see any problem with adding a little amitriptyline to her Xanax and her other medicines at this point an eyelid Psychiatry fine tune those medicines.       Past Medical History: History reviewed. No pertinent past medical history.    Past Surgical Historical:   Past Surgical History:   Procedure Laterality Date    APPENDECTOMY      TOTAL ABDOMINAL HYSTERECTOMY W/ BILATERAL SALPINGOOPHORECTOMY          Vitals: BP (!) 168/78 (BP Location: Left arm, Patient Position: Sitting, BP Method: Medium (Automatic))   Pulse 78   Temp 98.3 °F (36.8 °C) (Temporal)   Ht 5' 3" (1.6 m)   Wt 53.5 kg (118 lb)   LMP 12/13/2021 (Approximate)   SpO2 99%   BMI 20.90 kg/m²      Medications:   Medication List with Changes/Refills   New Medications    AMITRIPTYLINE (ELAVIL) 10 MG TABLET    Take 1 tablet (10 mg total) by mouth nightly " as needed for Insomnia.   Current Medications    ALPRAZOLAM (XANAX) 0.5 MG TABLET    TAKE 1 TABLET(0.5 MG) BY MOUTH DAILY AS NEEDED FOR ANXIETY    ESCITALOPRAM OXALATE (LEXAPRO) 20 MG TABLET    Take 1 tablet (20 mg total) by mouth once daily.    NITROFURANTOIN, MACROCRYSTAL-MONOHYDRATE, (MACROBID) 100 MG CAPSULE    Take 100 mg by mouth 2 (two) times daily.    PHENAZOPYRIDINE (PYRIDIUM) 100 MG TABLET            Past Social History:   Social History     Socioeconomic History    Marital status:    Tobacco Use    Smoking status: Current Every Day Smoker     Types: Cigarettes    Smokeless tobacco: Never Used   Substance and Sexual Activity    Alcohol use: Not Currently     Comment: former drinker     Drug use: Not Currently       Allergies: Review of patient's allergies indicates:  No Known Allergies     Family History:   Family History   Problem Relation Age of Onset    Cancer Father     Asthma Brother     Colon cancer Brother     Colon cancer Paternal Grandmother         Review of Systems:  Review of Systems   Respiratory: Negative for shortness of breath and wheezing.    Cardiovascular: Negative for chest pain and palpitations.   Gastrointestinal: Negative for abdominal pain, change in bowel habit and change in bowel habit.   Musculoskeletal: Negative for gait problem.   Neurological: Negative for dizziness and syncope.   Psychiatric/Behavioral: Positive for dysphoric mood and sleep disturbance. Negative for suicidal ideas. The patient is nervous/anxious.         Physical Exam:  Physical Exam  Constitutional:       Appearance: Normal appearance.   Cardiovascular:      Rate and Rhythm: Normal rate and regular rhythm.   Pulmonary:      Effort: Pulmonary effort is normal.      Breath sounds: Normal breath sounds.   Abdominal:      General: Abdomen is flat.      Palpations: Abdomen is soft.   Skin:     General: Skin is warm and dry.   Neurological:      General: No focal deficit present.      Mental  Status: She is alert and oriented to person, place, and time.   Psychiatric:         Mood and Affect: Mood normal.          Assessment/Plan:       Problem List Items Addressed This Visit        Psychiatric    Reactive depression    Relevant Orders    Ambulatory referral/consult to Psychiatry      Other Visit Diagnoses     Anxiety    -  Primary    Panic attacks        Routine general medical examination at a health care facility        Relevant Orders    Basic Metabolic Panel    CBC Auto Differential    Hemoglobin A1C    Hepatic Function Panel    TSH    Vitamin D    Lipid Panel    Insomnia, unspecified type        Relevant Medications    amitriptyline (ELAVIL) 10 MG tablet               No follow-ups on file.     Cain To

## 2022-03-08 ENCOUNTER — TELEPHONE (OUTPATIENT)
Dept: FAMILY MEDICINE | Facility: CLINIC | Age: 52
End: 2022-03-08
Payer: MEDICAID

## 2022-03-08 DIAGNOSIS — F41.9 ANXIETY AND DEPRESSION: ICD-10-CM

## 2022-03-08 DIAGNOSIS — F32.A ANXIETY AND DEPRESSION: ICD-10-CM

## 2022-03-08 DIAGNOSIS — F32.9 REACTIVE DEPRESSION: Primary | ICD-10-CM

## 2022-03-08 RX ORDER — ALPRAZOLAM 0.5 MG/1
0.5 TABLET ORAL DAILY PRN
Qty: 30 TABLET | Refills: 0 | Status: SHIPPED | OUTPATIENT
Start: 2022-03-08 | End: 2022-04-06 | Stop reason: SDUPTHER

## 2022-03-16 DIAGNOSIS — F41.9 ANXIETY AND DEPRESSION: ICD-10-CM

## 2022-03-16 DIAGNOSIS — F32.A ANXIETY AND DEPRESSION: ICD-10-CM

## 2022-03-16 RX ORDER — ALPRAZOLAM 0.5 MG/1
TABLET ORAL
Qty: 30 TABLET | OUTPATIENT
Start: 2022-03-16

## 2022-03-18 ENCOUNTER — TELEPHONE (OUTPATIENT)
Dept: FAMILY MEDICINE | Facility: CLINIC | Age: 52
End: 2022-03-18
Payer: MEDICAID

## 2022-03-18 NOTE — TELEPHONE ENCOUNTER
----- Message from Ashlee Galan sent at 3/18/2022 10:52 AM CDT -----  Contact: SELF  Type:  RX Refill Request    Who Called:  Cherelle Marquis   Refill or New Rx: REFILL  RX Name and Strength: ALPRAZolam (XANAX) 0.5 MG tablet  How is the patient currently taking it? (ex. 1XDay): 1 DAILY   Is this a 30 day or 90 day RX: 30 DAY    Preferred Pharmacy with phone number:   [id number] [address] & [address] [address]  Phone: [phone] Fax: [phone]    Local or Mail Order: LOCAL  Ordering Provider: MARILU  Would the patient rather a call back or a response via [facility]?  CALL BACK   Best Call Back Number: [phone]   Additional Information: N/A

## 2022-03-28 ENCOUNTER — OFFICE VISIT (OUTPATIENT)
Dept: OBSTETRICS AND GYNECOLOGY | Facility: CLINIC | Age: 52
End: 2022-03-28
Payer: MEDICAID

## 2022-03-28 VITALS
HEART RATE: 89 BPM | DIASTOLIC BLOOD PRESSURE: 65 MMHG | WEIGHT: 118 LBS | SYSTOLIC BLOOD PRESSURE: 111 MMHG | BODY MASS INDEX: 20.9 KG/M2

## 2022-03-28 DIAGNOSIS — Z90.721 S/P RIGHT OOPHORECTOMY: ICD-10-CM

## 2022-03-28 DIAGNOSIS — Z90.710 S/P LAPAROSCOPIC HYSTERECTOMY: Primary | ICD-10-CM

## 2022-03-28 PROCEDURE — 99024 POSTOP FOLLOW-UP VISIT: CPT | Mod: S$GLB,,, | Performed by: STUDENT IN AN ORGANIZED HEALTH CARE EDUCATION/TRAINING PROGRAM

## 2022-03-28 PROCEDURE — 1160F RVW MEDS BY RX/DR IN RCRD: CPT | Mod: CPTII,S$GLB,, | Performed by: STUDENT IN AN ORGANIZED HEALTH CARE EDUCATION/TRAINING PROGRAM

## 2022-03-28 PROCEDURE — 1160F PR REVIEW ALL MEDS BY PRESCRIBER/CLIN PHARMACIST DOCUMENTED: ICD-10-PCS | Mod: CPTII,S$GLB,, | Performed by: STUDENT IN AN ORGANIZED HEALTH CARE EDUCATION/TRAINING PROGRAM

## 2022-03-28 PROCEDURE — 3008F BODY MASS INDEX DOCD: CPT | Mod: CPTII,S$GLB,, | Performed by: STUDENT IN AN ORGANIZED HEALTH CARE EDUCATION/TRAINING PROGRAM

## 2022-03-28 PROCEDURE — 3078F PR MOST RECENT DIASTOLIC BLOOD PRESSURE < 80 MM HG: ICD-10-PCS | Mod: CPTII,S$GLB,, | Performed by: STUDENT IN AN ORGANIZED HEALTH CARE EDUCATION/TRAINING PROGRAM

## 2022-03-28 PROCEDURE — 99024 PR POST-OP FOLLOW-UP VISIT: ICD-10-PCS | Mod: S$GLB,,, | Performed by: STUDENT IN AN ORGANIZED HEALTH CARE EDUCATION/TRAINING PROGRAM

## 2022-03-28 PROCEDURE — 3074F PR MOST RECENT SYSTOLIC BLOOD PRESSURE < 130 MM HG: ICD-10-PCS | Mod: CPTII,S$GLB,, | Performed by: STUDENT IN AN ORGANIZED HEALTH CARE EDUCATION/TRAINING PROGRAM

## 2022-03-28 PROCEDURE — 3008F PR BODY MASS INDEX (BMI) DOCUMENTED: ICD-10-PCS | Mod: CPTII,S$GLB,, | Performed by: STUDENT IN AN ORGANIZED HEALTH CARE EDUCATION/TRAINING PROGRAM

## 2022-03-28 PROCEDURE — 3074F SYST BP LT 130 MM HG: CPT | Mod: CPTII,S$GLB,, | Performed by: STUDENT IN AN ORGANIZED HEALTH CARE EDUCATION/TRAINING PROGRAM

## 2022-03-28 PROCEDURE — 1159F PR MEDICATION LIST DOCUMENTED IN MEDICAL RECORD: ICD-10-PCS | Mod: CPTII,S$GLB,, | Performed by: STUDENT IN AN ORGANIZED HEALTH CARE EDUCATION/TRAINING PROGRAM

## 2022-03-28 PROCEDURE — 1159F MED LIST DOCD IN RCRD: CPT | Mod: CPTII,S$GLB,, | Performed by: STUDENT IN AN ORGANIZED HEALTH CARE EDUCATION/TRAINING PROGRAM

## 2022-03-28 PROCEDURE — 3078F DIAST BP <80 MM HG: CPT | Mod: CPTII,S$GLB,, | Performed by: STUDENT IN AN ORGANIZED HEALTH CARE EDUCATION/TRAINING PROGRAM

## 2022-03-28 NOTE — PROGRESS NOTES
Chief Complaint: post op    HPI: Patient is a 52 y.o. y.o. female G0 who presents to GYN clinic for post op visit. Pt is s/p TLH/RSO/LS presenting to clinic for vaginal cuff check. She is doing well today. No VB or pain. She is ambulating, voiding, tolerating PO diet.v She still dealing with depression following the death of her  but reports she is doing better. She is meeting with a counselor. She has no other complaints today. ROS negative unless mentioned above.     Physical Exam:  Vitals:    03/28/22 1310   BP: 111/65   Pulse: 89   Weight: 53.5 kg (118 lb)   Body mass index is 20.9 kg/m².    Gen: NAD, well developed, well nourished  Psych: alert and oriented x 3, normal affect  HEENT: normocephalic, atraumatic  Abd: soft, non-tender, non-distended  Pelvic: NEFG, no masses or lesions, vagina pink with ragae, no VB or VD, cuff intact  BME: cuff intact, non tender  Skin: warm and dry  Ext: no c/c/c, moves all extremities  Neurological: normal gait, gross motor function intact    Results:  UTERUS AND CERVIX AND BILATERAL FALLOPIAN TUBES AND RIGHT OVARY,   HYSTERECTOMY WITH BILATERAL SALPINGECTOMY AND RIGHT OOPHORECTOMY:        UTERUS:        SEROSA:             - UNREMARKABLE.        CERVIX / ENDOCERVIX:             - BENIGN SQUAMOUS ECTOCERVIX AND GLANDULAR ENDOCERVIX WITH   REACTIVE CHANGES.        ENDOMETRIUM:             - BENIGN DISORDERED PROLIFERATIVE PHASE ENDOMETRIUM, NEGATIVE FOR   ATYPICAL HYPERPLASIA OR               MALIGNANCY.        MYOMETRIUM:             - ADENOMYOSIS AND LEIOMYOMA.        BILATERAL FALLOPIAN TUBES:             - BILATERALLY BENIGN FALLOPIAN TUBES WITH FIMBRIATED ENDS.        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    Assessment: Patient is a 52 y.o. y.o. female G0 with  Patient Active Problem List   Diagnosis    Hx of appendectomy    Family history of colon cancer - multiple family members, reports genetic testing performed     H/O dilation and  curettage x 2 for SAB    Anxiety and depression    S/P laparoscopic hysterectomy    S/P right oophorectomy    Reactive depression       Plan:  Pt doing well post op  Cuff intact  Pt released from post op restrictions  Pt to continue meeting with counselor for depression  RTC in 1 yr for annual      Shahid Oreilly MD

## 2022-04-06 DIAGNOSIS — F41.9 ANXIETY AND DEPRESSION: ICD-10-CM

## 2022-04-06 DIAGNOSIS — F32.A ANXIETY AND DEPRESSION: ICD-10-CM

## 2022-04-06 RX ORDER — ALPRAZOLAM 0.5 MG/1
0.5 TABLET ORAL DAILY PRN
Qty: 30 TABLET | Refills: 0 | Status: SHIPPED | OUTPATIENT
Start: 2022-04-06 | End: 2022-05-04 | Stop reason: SDUPTHER

## 2022-04-08 ENCOUNTER — TELEPHONE (OUTPATIENT)
Dept: FAMILY MEDICINE | Facility: CLINIC | Age: 52
End: 2022-04-08
Payer: MEDICAID

## 2022-04-08 NOTE — TELEPHONE ENCOUNTER
----- Message from Larissa Rodriguez sent at 4/8/2022  9:26 AM CDT -----  Contact: self  Pt calling to check of script wanted to know if 10 more pills for ALPRAZolam (XANAX) 0.5 MG  and she can be reached [phone]       [facility] #[id number] - [address] & [address] [address]  Phone: [phone] Fax: [phone]      Thanks,

## 2022-04-22 ENCOUNTER — TELEPHONE (OUTPATIENT)
Dept: FAMILY MEDICINE | Facility: CLINIC | Age: 52
End: 2022-04-22
Payer: MEDICAID

## 2022-04-22 NOTE — TELEPHONE ENCOUNTER
----- Message from Anila Delgado sent at 4/22/2022 10:26 AM CDT -----  FYI: [facility] fax is [phone]

## 2022-04-22 NOTE — TELEPHONE ENCOUNTER
----- Message from Aspen Christie LPN sent at 4/21/2022  4:52 PM CDT -----  Contact: self    ----- Message -----  From: Rebekah Lance  Sent: 4/21/2022   2:05 PM CDT  To: Zuleika Richey Staff    Patient called and asked if she can get a call back regarding sending her office notes to a physiatrist. Patient can be reached at [phone]

## 2022-05-04 ENCOUNTER — TELEPHONE (OUTPATIENT)
Dept: FAMILY MEDICINE | Facility: CLINIC | Age: 52
End: 2022-05-04
Payer: MEDICAID

## 2022-05-04 DIAGNOSIS — F41.9 ANXIETY AND DEPRESSION: ICD-10-CM

## 2022-05-04 DIAGNOSIS — F32.A ANXIETY AND DEPRESSION: ICD-10-CM

## 2022-05-04 RX ORDER — ALPRAZOLAM 0.5 MG/1
0.5 TABLET ORAL DAILY PRN
Qty: 40 TABLET | Refills: 0 | Status: SHIPPED | OUTPATIENT
Start: 2022-05-04 | End: 2022-05-31 | Stop reason: SDUPTHER

## 2022-05-04 NOTE — TELEPHONE ENCOUNTER
----- Message from Araseli Rosas sent at 5/4/2022 12:56 PM CDT -----  Type:  Needs Medical Advice    Who Called: Cherelle Marquis    Symptoms (please be specific): out of medication    How long has patient had these symptoms:  -  Pharmacy name and phone #:   [id number] - [place], [place] - [address] & [address] [address]  Phone: [phone] Fax: [phone]  Would the patient rather a call back or a response via [facility]?    Best Call Back Number: [phone]    Additional Information: pharmacy says that can fill them on the 6th May ( 2 days earlier) supposed to be filled on 5/8/22 please call wants to know if the Dr can authorize for her to get medication refill today

## 2022-05-04 NOTE — TELEPHONE ENCOUNTER
Patient states that she has been trying to get in with a one on one counselor or psychiatrist Dr. Pawan Morrow but has not had any luck. She calls everyday. They have a wait list until June but she doesn't want to go to any other places that do group therapy. Her  passed and she has been trying to cope but it has been hard. When she was seeing Jessica Rubin, she was getting Xanax .5 3 times daily, and now she get it once. She was willing to try to decrease her meds, but being that she is going through depression, it is much hard to manage. She would like to know if you can increase her meds to twice daily or 3 times daily until she gets in with Dr. Morrow.

## 2022-05-12 ENCOUNTER — TELEPHONE (OUTPATIENT)
Dept: FAMILY MEDICINE | Facility: CLINIC | Age: 52
End: 2022-05-12
Payer: MEDICAID

## 2022-05-12 NOTE — TELEPHONE ENCOUNTER
----- Message from Larissa Rodriguez sent at 5/12/2022 11:55 AM CDT -----  Contact: Apoorva moran/Dr Cristhian moran/Dr Morrow office calling about face sheet and she been calling for about a week.   She can be reached at  [phone]. And fax number [phone].    Thanks,

## 2022-05-27 ENCOUNTER — PATIENT MESSAGE (OUTPATIENT)
Dept: FAMILY MEDICINE | Facility: CLINIC | Age: 52
End: 2022-05-27
Payer: MEDICAID

## 2022-05-27 DIAGNOSIS — F41.9 ANXIETY AND DEPRESSION: ICD-10-CM

## 2022-05-27 DIAGNOSIS — F32.A ANXIETY AND DEPRESSION: ICD-10-CM

## 2022-05-27 RX ORDER — ALPRAZOLAM 0.5 MG/1
0.5 TABLET ORAL DAILY PRN
Qty: 40 TABLET | Refills: 0 | OUTPATIENT
Start: 2022-05-27

## 2022-05-27 NOTE — TELEPHONE ENCOUNTER
----- Message from Katarzyna Reeves sent at 5/27/2022  8:23 AM CDT -----  Patient calling for refill on medication,   ALPRAZolam (XANAX) 0.5 MG tablet      [id number] - [address] & [address] [address]  Phone: [phone] Fax: [phone]      Call back number [phone]

## 2022-05-31 ENCOUNTER — PATIENT MESSAGE (OUTPATIENT)
Dept: FAMILY MEDICINE | Facility: CLINIC | Age: 52
End: 2022-05-31
Payer: MEDICAID

## 2022-06-01 ENCOUNTER — OFFICE VISIT (OUTPATIENT)
Dept: FAMILY MEDICINE | Facility: CLINIC | Age: 52
End: 2022-06-01
Payer: MEDICAID

## 2022-06-01 VITALS
WEIGHT: 120.38 LBS | BODY MASS INDEX: 21.33 KG/M2 | TEMPERATURE: 98 F | HEIGHT: 63 IN | SYSTOLIC BLOOD PRESSURE: 118 MMHG | DIASTOLIC BLOOD PRESSURE: 77 MMHG | OXYGEN SATURATION: 95 % | HEART RATE: 107 BPM

## 2022-06-01 DIAGNOSIS — F32.A ANXIETY AND DEPRESSION: ICD-10-CM

## 2022-06-01 DIAGNOSIS — Z00.00 ROUTINE GENERAL MEDICAL EXAMINATION AT A HEALTH CARE FACILITY: ICD-10-CM

## 2022-06-01 DIAGNOSIS — F32.9 REACTIVE DEPRESSION: ICD-10-CM

## 2022-06-01 DIAGNOSIS — F41.9 ANXIETY AND DEPRESSION: ICD-10-CM

## 2022-06-01 DIAGNOSIS — S69.92XS WRIST INJURY, LEFT, SEQUELA: Primary | ICD-10-CM

## 2022-06-01 LAB
ABS NRBC COUNT: 0 X 10 3/UL (ref 0–0.01)
ABSOLUTE BASOPHIL: 0.05 X 10 3/UL (ref 0–0.22)
ABSOLUTE EOSINOPHIL: 0.28 X 10 3/UL (ref 0.04–0.54)
ABSOLUTE IMMATURE GRAN: 0.03 X 10 3/UL (ref 0–0.04)
ABSOLUTE LYMPHOCYTE: 3.19 X 10 3/UL (ref 0.86–4.75)
ABSOLUTE MONOCYTE: 0.73 X 10 3/UL (ref 0.22–1.08)
ALBUMIN SERPL-MCNC: 4.7 G/DL (ref 3.5–5.2)
ALP ISOS SERPL LEV INH-CCNC: 81 U/L (ref 35–105)
ALT (SGPT): 23 U/L (ref 0–33)
ANION GAP SERPL CALC-SCNC: 11 MMOL/L (ref 8–17)
AST SERPL-CCNC: 31 U/L (ref 0–32)
BASOPHILS NFR BLD: 0.5 % (ref 0.2–1.2)
BILIRUB CONJ+UNCONJ SERPL-MCNC: NORMAL MG/DL (ref 0.1–0.8)
BILIRUBIN DIRECT+TOT PNL SERPL-MCNC: <0.2 MG/DL (ref 0–0.3)
BILIRUBIN, TOTAL: <0.15 MG/DL (ref 0–1.2)
BUN/CREAT SERPL: 11.8 (ref 6–20)
CALCIUM SERPL-MCNC: 9.5 MG/DL (ref 8.6–10.2)
CARBON DIOXIDE, CO2: 28 MMOL/L (ref 22–29)
CHLORIDE: 102 MMOL/L (ref 98–107)
CHOLEST SERPL-MSCNC: 252 MG/DL (ref 100–200)
CREAT SERPL-MCNC: 0.65 MG/DL (ref 0.5–0.9)
EOSINOPHIL NFR BLD: 3 % (ref 0.7–7)
ESTIMATED AVERAGE GLUCOSE: 105 MG/DL
GFR ESTIMATION: 95.35
GLOBULIN: 2.2 G/DL (ref 1.5–4.5)
GLUCOSE: 82 MG/DL (ref 74–106)
HBA1C MFR BLD: 5.3 % (ref 4–6)
HCT VFR BLD AUTO: 41.2 % (ref 37–47)
HDLC SERPL-MCNC: 50 MG/DL
HGB BLD-MCNC: 14.3 G/DL (ref 12–16)
IMMATURE GRANULOCYTES: 0.3 % (ref 0–0.5)
LDL/HDL RATIO: 2.5 (ref 1–3)
LDLC SERPL CALC-MCNC: 125.4 MG/DL (ref 0–100)
LYMPHOCYTES NFR BLD: 33.6 % (ref 19.3–53.1)
MCH RBC QN AUTO: 31 PG (ref 27–32)
MCHC RBC AUTO-ENTMCNC: 34.7 G/DL (ref 32–36)
MCV RBC AUTO: 89.4 FL (ref 82–100)
MONOCYTES NFR BLD: 7.7 % (ref 4.7–12.5)
NEUTROPHILS # BLD AUTO: 5.2 X 10 3/UL (ref 2.15–7.56)
NEUTROPHILS NFR BLD: 54.9 % (ref 34–71.1)
NUCLEATED RED BLOOD CELLS: 0 /100 WBC (ref 0–0.2)
PLATELET # BLD AUTO: 309 X 10 3/UL (ref 135–400)
POTASSIUM: 4.7 MMOL/L (ref 3.5–5.1)
PROT SNV-MCNC: 6.9 G/DL (ref 6.4–8.3)
RBC # BLD AUTO: 4.61 X 10 6/UL (ref 4.2–5.4)
RDW-SD: 49.1 FL (ref 37–54)
SODIUM: 141 MMOL/L (ref 136–145)
SUB-OPTIMAL CRITERIA: NORMAL
TRIGL SERPL-MCNC: 383 MG/DL (ref 0–150)
TSH SERPL DL<=0.005 MIU/L-ACNC: 2.06 UIU/ML (ref 0.27–4.2)
UREA NITROGEN (BUN): 7.7 MG/DL (ref 6–20)
VITAMIN D (25OHD): 33.8 NG/ML
WBC # BLD: 9.48 X 10 3/UL (ref 4.3–10.8)

## 2022-06-01 PROCEDURE — 3078F DIAST BP <80 MM HG: CPT | Mod: CPTII,S$GLB,, | Performed by: INTERNAL MEDICINE

## 2022-06-01 PROCEDURE — 3074F PR MOST RECENT SYSTOLIC BLOOD PRESSURE < 130 MM HG: ICD-10-PCS | Mod: CPTII,S$GLB,, | Performed by: INTERNAL MEDICINE

## 2022-06-01 PROCEDURE — 99214 PR OFFICE/OUTPT VISIT, EST, LEVL IV, 30-39 MIN: ICD-10-PCS | Mod: S$GLB,,, | Performed by: INTERNAL MEDICINE

## 2022-06-01 PROCEDURE — 3008F BODY MASS INDEX DOCD: CPT | Mod: CPTII,S$GLB,, | Performed by: INTERNAL MEDICINE

## 2022-06-01 PROCEDURE — 3078F PR MOST RECENT DIASTOLIC BLOOD PRESSURE < 80 MM HG: ICD-10-PCS | Mod: CPTII,S$GLB,, | Performed by: INTERNAL MEDICINE

## 2022-06-01 PROCEDURE — 1160F PR REVIEW ALL MEDS BY PRESCRIBER/CLIN PHARMACIST DOCUMENTED: ICD-10-PCS | Mod: CPTII,S$GLB,, | Performed by: INTERNAL MEDICINE

## 2022-06-01 PROCEDURE — 3074F SYST BP LT 130 MM HG: CPT | Mod: CPTII,S$GLB,, | Performed by: INTERNAL MEDICINE

## 2022-06-01 PROCEDURE — 99214 OFFICE O/P EST MOD 30 MIN: CPT | Mod: S$GLB,,, | Performed by: INTERNAL MEDICINE

## 2022-06-01 PROCEDURE — 1159F PR MEDICATION LIST DOCUMENTED IN MEDICAL RECORD: ICD-10-PCS | Mod: CPTII,S$GLB,, | Performed by: INTERNAL MEDICINE

## 2022-06-01 PROCEDURE — 1159F MED LIST DOCD IN RCRD: CPT | Mod: CPTII,S$GLB,, | Performed by: INTERNAL MEDICINE

## 2022-06-01 PROCEDURE — 3008F PR BODY MASS INDEX (BMI) DOCUMENTED: ICD-10-PCS | Mod: CPTII,S$GLB,, | Performed by: INTERNAL MEDICINE

## 2022-06-01 PROCEDURE — 1160F RVW MEDS BY RX/DR IN RCRD: CPT | Mod: CPTII,S$GLB,, | Performed by: INTERNAL MEDICINE

## 2022-06-01 RX ORDER — HYDROCODONE BITARTRATE AND ACETAMINOPHEN 5; 325 MG/1; MG/1
1 TABLET ORAL EVERY 6 HOURS PRN
COMMUNITY
Start: 2022-05-30

## 2022-06-01 RX ORDER — ALPRAZOLAM 0.5 MG/1
0.5 TABLET ORAL DAILY PRN
Qty: 40 TABLET | Refills: 0 | Status: CANCELLED | OUTPATIENT
Start: 2022-06-01

## 2022-06-01 NOTE — PROGRESS NOTES
"Subjective:       Patient ID: Cherelle Marquis is a 52 y.o. female.    Chief Complaint: Follow-up (Went to ER for wrist injury, will need a referral to an ortho)      HPI: Keshia comes in today for follow-up.  She fell injured her left wrist.  She went to the emergency room at [facility] on [address].  They did an x-ray of her wrist and did not see any serious issues.  She went back to the emergency room a couple of days later and they did a CT which did not show any fracture.  She continues to have some swelling and pain in that wrist.  She says she also has some numbness in her fingers.  We are going to set her up to see Dr. Edwards.  She is requesting pain medications I am going to give her some Ultram to use p.r.n..  She is scheduled to see Dr. Morrow, Psychiatry, in the next few weeks to help manage her anxiety  and depression.  There is some blood work that was scheduled and missed and so were going to get caught up on that today       Past Medical History: History reviewed. No pertinent past medical history.    Past Surgical Historical:   Past Surgical History:   Procedure Laterality Date    APPENDECTOMY      TOTAL ABDOMINAL HYSTERECTOMY W/ BILATERAL SALPINGOOPHORECTOMY          Vitals: /77 (BP Location: Right arm, Patient Position: Sitting, BP Method: Medium (Automatic))   Pulse 107   Temp 97.6 °F (36.4 °C)   Ht 5' 3" (1.6 m)   Wt 54.6 kg (120 lb 6 oz)   LMP 12/13/2021 (Approximate)   SpO2 95%   BMI 21.32 kg/m²      Medications:   Medication List with Changes/Refills   Current Medications    ALPRAZOLAM (XANAX) 0.5 MG TABLET    Take 1 tablet (0.5 mg total) by mouth daily as needed for Anxiety.    AMITRIPTYLINE (ELAVIL) 10 MG TABLET    TAKE 1 TABLET(10 MG) BY MOUTH EVERY NIGHT AS NEEDED FOR INSOMNIA    ESCITALOPRAM OXALATE (LEXAPRO) 20 MG TABLET    Take 1 tablet (20 mg total) by mouth once daily.    HYDROCODONE-ACETAMINOPHEN (NORCO) 5-325 MG PER TABLET    Take 1 tablet by mouth every 6 (six) " hours as needed.    NITROFURANTOIN, MACROCRYSTAL-MONOHYDRATE, (MACROBID) 100 MG CAPSULE    Take 100 mg by mouth 2 (two) times daily.    PHENAZOPYRIDINE (PYRIDIUM) 100 MG TABLET            Past Social History:   Social History     Socioeconomic History    Marital status:    Tobacco Use    Smoking status: Current Every Day Smoker     Types: Cigarettes    Smokeless tobacco: Never Used   Substance and Sexual Activity    Alcohol use: Not Currently     Comment: former drinker     Drug use: Not Currently       Allergies: Review of patient's allergies indicates:  No Known Allergies     Family History:   Family History   Problem Relation Age of Onset    Cancer Father     Asthma Brother     Colon cancer Brother     Colon cancer Paternal Grandmother         Review of Systems:  Review of Systems   Respiratory: Negative for shortness of breath and wheezing.    Cardiovascular: Negative for chest pain and palpitations.   Gastrointestinal: Negative for abdominal pain, change in bowel habit and change in bowel habit.   Musculoskeletal: Positive for joint swelling. Negative for gait problem.   Neurological: Negative for dizziness and syncope.   Psychiatric/Behavioral: Negative for suicidal ideas.        Physical Exam:  Physical Exam  Constitutional:       Appearance: Normal appearance.   Cardiovascular:      Rate and Rhythm: Normal rate and regular rhythm.   Pulmonary:      Effort: Pulmonary effort is normal.      Breath sounds: Normal breath sounds.   Abdominal:      General: Abdomen is flat.      Palpations: Abdomen is soft.   Musculoskeletal:         General: Swelling, tenderness and signs of injury present.   Skin:     General: Skin is warm and dry.   Neurological:      General: No focal deficit present.      Mental Status: She is alert and oriented to person, place, and time.   Psychiatric:         Mood and Affect: Mood normal.          Labs:  Orders Only on 02/10/2022   Component Date Value Ref Range Status     Specimen to Pathology 02/10/2022                                    [facility]   Final   Orders Only on 02/08/2022   Component Date Value Ref Range Status    hCG Qual 02/08/2022 Negative  Negative Final   Clinical Support on 01/19/2022   Component Date Value Ref Range Status    SARS Coronavirus 2 Antigen 01/19/2022 Positive (A) Negative Corrected     Acceptable 01/19/2022 Yes   Final   Orders Only on 01/18/2022   Component Date Value Ref Range Status    Sodium 01/18/2022 139  135 - 145 mmol/L Final    Potassium 01/18/2022 4.0  3.6 - 5.2 mmol/L Final    Chloride 01/18/2022 102  100 - 108 mmol/L Final    CO2 01/18/2022 28  21 - 32 mmol/L Final    Anion Gap 01/18/2022 9.0  3.0 - 11.0 mmol/L Final    BUN 01/18/2022 6 (A) 7 - 18 mg/dL Final    Creatinine 01/18/2022 0.72  0.55 - 1.02 mg/dL Final    GFR ESTIMATION 01/18/2022 > 60  >60 Final    BUN/Creatinine Ratio 01/18/2022 8.33  7 - 18 Ratio Final    Glucose 01/18/2022 87  70 - 110 mg/dL Final    Calcium 01/18/2022 9.3  8.8 - 10.5 mg/dL Final   Orders Only on 01/18/2022   Component Date Value Ref Range Status    Specimen Collection Method 01/18/2022 Cl Catch [place]   Final    Color, UA 01/18/2022 Pale Yellow  Yellow Final    Appearance, UA 01/18/2022 Clear  Clear Final    pH, UA 01/18/2022 6.0  5.0 - 9.0 pH Final    Specific Gravity, UA 01/18/2022 1.010  1.000 - 1.030 Final    Protein, UA 01/18/2022 Negative  Negative mg/dL Final    Glucose, UA 01/18/2022 Normal  Normal mg/dL Final    Ketones, UA 01/18/2022 Negative  Negative mg/dL Final    Occult Blood UA 01/18/2022 Negative  Negative /uL Final    Nitrite, UA 01/18/2022 Negative  Negative Final    Bilirubin (UA) 01/18/2022 Negative  Negative mg/dL Final    Urobilinogen, UA 01/18/2022 Normal  Normal mg/dL Final    Leukocytes, UA 01/18/2022 Negative  Negative /uL Final    Preg Test, Ur 01/18/2022 Negative  Negative Final   Orders Only on 01/18/2022   Component  Date Value Ref Range Status    WBC 01/18/2022 11.6 (A) 4.6 - 10.2 10*3/uL Final    RBC 01/18/2022 5.08  4.2 - 5.4 10*6/uL Final    Hemoglobin 01/18/2022 15.3  12.0 - 16.0 g/dL Final    Hematocrit 01/18/2022 44.8  37.0 - 47.0 % Final    MCV 01/18/2022 88.2  80 - 97 fL Final    MCH 01/18/2022 30.1  27.0 - 31.2 pg Final    MCHC 01/18/2022 34.2  31.8 - 35.4 g/dL Final    RDW RBC Auto-Rto 01/18/2022 14.5  12.5 - 18.0 % Final    Platelets 01/18/2022 280  142 - 424 10*3/uL Final    Neutrophils 01/18/2022 63.3  37 - 80 % Final    Lymphocytes 01/18/2022 27.2  25 - 40 % Final    Monocytes 01/18/2022 5.4  1 - 15 % Final    Eosinophils 01/18/2022 2.9  1 - 3 % Final    Basophils 01/18/2022 0.5  0 - 3 % Final    nRBC# 01/18/2022 0.0  % Final    Neutrophils Absolute 01/18/2022 7.31  1.8 - 7.7 10*3/uL Final   Office Visit on 12/27/2021   Component Date Value Ref Range Status    Source 12/27/2021 SWAB   Final    TRICHOMONAS AMPLIFIED 12/27/2021 NEGATIVE  NEGATIVE Final    Source 12/27/2021 SWAB   Final    Chlamydia 12/27/2021 NEGATIVE  NEGATIVE Final    Gonorrhea 12/27/2021 NEGATIVE  NEGATIVE Final        Assessment/Plan:       Problem List Items Addressed This Visit        Psychiatric    Anxiety and depression    Reactive depression      Other Visit Diagnoses     Wrist injury, left, sequela    -  Primary    Relevant Orders    Ambulatory referral/consult to Orthopedics    Routine general medical examination at a health care facility                   No follow-ups on file.     Cain To

## 2022-06-03 ENCOUNTER — PATIENT MESSAGE (OUTPATIENT)
Dept: FAMILY MEDICINE | Facility: CLINIC | Age: 52
End: 2022-06-03
Payer: MEDICAID

## 2022-06-03 ENCOUNTER — TELEPHONE (OUTPATIENT)
Dept: FAMILY MEDICINE | Facility: CLINIC | Age: 52
End: 2022-06-03
Payer: MEDICAID

## 2022-06-03 NOTE — TELEPHONE ENCOUNTER
C/o wrist pain, she said you were going to call her out pain rx for wrist.  [facility] and [facility]

## 2022-06-03 NOTE — TELEPHONE ENCOUNTER
----- Message from Wendy Mejia sent at 6/3/2022  8:34 AM CDT -----  Contact: Patient  Patient called to consult with nurse or staff regarding a miss call from yesterday. She states the message that was left on the voicemail was regarding an appointment. Patient would like a call back and can be reached at [phone]. Thanks/

## 2022-06-03 NOTE — TELEPHONE ENCOUNTER
----- Message from Cain To MD sent at 6/1/2022  2:03 PM CDT -----  Her cholesterol and triglycerides are very elevated.  Would start her on Crestor 10 mg once a day.  She will need to check her lipids and her liver panel in about 6 weeks.

## 2022-06-04 ENCOUNTER — PATIENT MESSAGE (OUTPATIENT)
Dept: FAMILY MEDICINE | Facility: CLINIC | Age: 52
End: 2022-06-04
Payer: MEDICAID

## 2022-06-06 ENCOUNTER — TELEPHONE (OUTPATIENT)
Dept: ORTHOPEDICS | Facility: CLINIC | Age: 52
End: 2022-06-06
Payer: MEDICAID

## 2022-06-06 ENCOUNTER — PATIENT MESSAGE (OUTPATIENT)
Dept: FAMILY MEDICINE | Facility: CLINIC | Age: 52
End: 2022-06-06
Payer: MEDICAID

## 2022-06-06 NOTE — TELEPHONE ENCOUNTER
----- Message from Radha Duvall sent at 6/6/2022 11:36 AM CDT -----  Contact: PT  .Type:  Sooner Appointment Request    Caller is requesting a sooner appointment.  Caller declined first available appointment listed below.  Caller will not accept being placed on the waitlist and is requesting a message be sent to doctor.  Name of Caller: Cherelle    When is the first available appointment? 06/09/2022   Symptoms: referral on chart   Would the patient rather a call back or a response via [facility]?  Callback   Best Call Back Number:  [phone] (home)     Additional Information:

## 2022-06-07 ENCOUNTER — TELEPHONE (OUTPATIENT)
Dept: FAMILY MEDICINE | Facility: CLINIC | Age: 52
End: 2022-06-07
Payer: MEDICAID

## 2022-06-09 ENCOUNTER — OFFICE VISIT (OUTPATIENT)
Dept: ORTHOPEDICS | Facility: CLINIC | Age: 52
End: 2022-06-09
Payer: MEDICAID

## 2022-06-09 ENCOUNTER — PATIENT MESSAGE (OUTPATIENT)
Dept: ORTHOPEDICS | Facility: CLINIC | Age: 52
End: 2022-06-09

## 2022-06-09 VITALS — TEMPERATURE: 101 F | HEIGHT: 63 IN | WEIGHT: 122.69 LBS | BODY MASS INDEX: 21.74 KG/M2

## 2022-06-09 DIAGNOSIS — S52.552A OTHER CLOSED EXTRA-ARTICULAR FRACTURE OF DISTAL END OF LEFT RADIUS, INITIAL ENCOUNTER: Primary | ICD-10-CM

## 2022-06-09 PROCEDURE — 25500 PR CLOSED RX RADIAL SHAFT FX: ICD-10-PCS | Mod: LT,S$GLB,, | Performed by: ORTHOPAEDIC SURGERY

## 2022-06-09 PROCEDURE — 99203 OFFICE O/P NEW LOW 30 MIN: CPT | Mod: 25,S$GLB,, | Performed by: ORTHOPAEDIC SURGERY

## 2022-06-09 PROCEDURE — 3044F HG A1C LEVEL LT 7.0%: CPT | Mod: CPTII,S$GLB,, | Performed by: ORTHOPAEDIC SURGERY

## 2022-06-09 PROCEDURE — 1159F MED LIST DOCD IN RCRD: CPT | Mod: CPTII,S$GLB,, | Performed by: ORTHOPAEDIC SURGERY

## 2022-06-09 PROCEDURE — 99203 PR OFFICE/OUTPT VISIT, NEW, LEVL III, 30-44 MIN: ICD-10-PCS | Mod: 25,S$GLB,, | Performed by: ORTHOPAEDIC SURGERY

## 2022-06-09 PROCEDURE — 1159F PR MEDICATION LIST DOCUMENTED IN MEDICAL RECORD: ICD-10-PCS | Mod: CPTII,S$GLB,, | Performed by: ORTHOPAEDIC SURGERY

## 2022-06-09 PROCEDURE — 25500 CLTX RDL SHFT FX W/O MNPJ: CPT | Mod: LT,S$GLB,, | Performed by: ORTHOPAEDIC SURGERY

## 2022-06-09 PROCEDURE — 3008F PR BODY MASS INDEX (BMI) DOCUMENTED: ICD-10-PCS | Mod: CPTII,S$GLB,, | Performed by: ORTHOPAEDIC SURGERY

## 2022-06-09 PROCEDURE — 1160F PR REVIEW ALL MEDS BY PRESCRIBER/CLIN PHARMACIST DOCUMENTED: ICD-10-PCS | Mod: CPTII,S$GLB,, | Performed by: ORTHOPAEDIC SURGERY

## 2022-06-09 PROCEDURE — 1160F RVW MEDS BY RX/DR IN RCRD: CPT | Mod: CPTII,S$GLB,, | Performed by: ORTHOPAEDIC SURGERY

## 2022-06-09 PROCEDURE — 3008F BODY MASS INDEX DOCD: CPT | Mod: CPTII,S$GLB,, | Performed by: ORTHOPAEDIC SURGERY

## 2022-06-09 PROCEDURE — 3044F PR MOST RECENT HEMOGLOBIN A1C LEVEL <7.0%: ICD-10-PCS | Mod: CPTII,S$GLB,, | Performed by: ORTHOPAEDIC SURGERY

## 2022-06-09 RX ORDER — ROSUVASTATIN CALCIUM 10 MG/1
10 TABLET, COATED ORAL DAILY
COMMUNITY
Start: 2022-06-04

## 2022-06-09 RX ORDER — OXYCODONE AND ACETAMINOPHEN 7.5; 325 MG/1; MG/1
1 TABLET ORAL EVERY 8 HOURS PRN
Qty: 21 TABLET | Refills: 0 | Status: SHIPPED | OUTPATIENT
Start: 2022-06-09

## 2022-06-09 RX ORDER — NAPROXEN 500 MG/1
500 TABLET ORAL 2 TIMES DAILY
COMMUNITY
Start: 2022-06-07 | End: 2022-07-08 | Stop reason: SDUPTHER

## 2022-06-09 NOTE — PROGRESS NOTES
Subjective:      Patient ID: Cherelle Marquis is a 52 y.o. female.    Chief Complaint: Injury of the Left Wrist    HPI 52-year-old lady who fell backwards catching herself on her outstretched left hand.  This happened 10 days ago.  She was seen in the emergency room and x-rayed.  X-rays were interpreted as normal.  Follow-up CT scan shows a nondisplaced extra-articular fracture of the distal radius.    Review of Systems   Constitutional: Negative for fever and weight loss.   Cardiovascular: Negative for chest pain and leg swelling.   Musculoskeletal: Positive for joint pain, joint swelling and stiffness. Negative for arthritis and muscle weakness.   Gastrointestinal: Negative for change in bowel habit.   Genitourinary: Negative for bladder incontinence and hematuria.   Neurological: Negative for focal weakness, numbness, paresthesias and sensory change.         Objective:      Examination of the left wrist shows swelling and ecchymosis.  Range of motion is not attempted.  She is tender with palpation of the fracture site.  She has normal sensation and capillary refill      Ortho/SPM Exam            Assessment:       Encounter Diagnosis   Name Primary?    Other closed extra-articular fracture of distal end of left radius, initial encounter Yes          Plan:       Cherelle was seen today for injury.    Diagnoses and all orders for this visit:    Other closed extra-articular fracture of distal end of left radius, initial encounter    She is placed in a short-arm splint.  She is encouraged to make a fist with the fingers of the left hand several times a day.  Return 3 weeks for x-rays

## 2022-06-30 ENCOUNTER — OFFICE VISIT (OUTPATIENT)
Dept: ORTHOPEDICS | Facility: CLINIC | Age: 52
End: 2022-06-30
Payer: MEDICAID

## 2022-06-30 DIAGNOSIS — S52.552A OTHER CLOSED EXTRA-ARTICULAR FRACTURE OF DISTAL END OF LEFT RADIUS, INITIAL ENCOUNTER: Primary | ICD-10-CM

## 2022-06-30 PROCEDURE — 99024 POSTOP FOLLOW-UP VISIT: CPT | Mod: S$GLB,,, | Performed by: ORTHOPAEDIC SURGERY

## 2022-06-30 PROCEDURE — 3044F PR MOST RECENT HEMOGLOBIN A1C LEVEL <7.0%: ICD-10-PCS | Mod: CPTII,S$GLB,, | Performed by: ORTHOPAEDIC SURGERY

## 2022-06-30 PROCEDURE — 1160F RVW MEDS BY RX/DR IN RCRD: CPT | Mod: CPTII,S$GLB,, | Performed by: ORTHOPAEDIC SURGERY

## 2022-06-30 PROCEDURE — 1160F PR REVIEW ALL MEDS BY PRESCRIBER/CLIN PHARMACIST DOCUMENTED: ICD-10-PCS | Mod: CPTII,S$GLB,, | Performed by: ORTHOPAEDIC SURGERY

## 2022-06-30 PROCEDURE — 1159F PR MEDICATION LIST DOCUMENTED IN MEDICAL RECORD: ICD-10-PCS | Mod: CPTII,S$GLB,, | Performed by: ORTHOPAEDIC SURGERY

## 2022-06-30 PROCEDURE — 99024 PR POST-OP FOLLOW-UP VISIT: ICD-10-PCS | Mod: S$GLB,,, | Performed by: ORTHOPAEDIC SURGERY

## 2022-06-30 PROCEDURE — 3044F HG A1C LEVEL LT 7.0%: CPT | Mod: CPTII,S$GLB,, | Performed by: ORTHOPAEDIC SURGERY

## 2022-06-30 PROCEDURE — 1159F MED LIST DOCD IN RCRD: CPT | Mod: CPTII,S$GLB,, | Performed by: ORTHOPAEDIC SURGERY

## 2022-06-30 NOTE — PROGRESS NOTES
Subjective:      Patient ID: Cherelle Marquis is a 52 y.o. female.    Chief Complaint: Pain of the Left Wrist    HPI patient is a month out from her left distal radius fracture.  She still has some discomfort especially with wrist extension.    ROS unchanged from prior visit      Objective:      There is no swelling.  Range of motion of the wrist is normal.  She still has tenderness with palpation of the fracture site.  There is normal sensation and pulses.      Ortho/SPM Exam            Assessment:       Encounter Diagnosis   Name Primary?    Other closed extra-articular fracture of distal end of left radius, initial encounter Yes          Plan:       Cherelle was seen today for pain.    Diagnoses and all orders for this visit:    Other closed extra-articular fracture of distal end of left radius, initial encounter  -     X-Ray Wrist 2 View Left; Future    X-rays show internal callus formation.    She can wean out of her brace over the next

## 2022-07-11 RX ORDER — NAPROXEN 500 MG/1
500 TABLET ORAL 2 TIMES DAILY
Qty: 60 TABLET | Refills: 6 | Status: SHIPPED | OUTPATIENT
Start: 2022-07-11

## 2022-11-14 RX ORDER — ROSUVASTATIN CALCIUM 10 MG/1
TABLET, COATED ORAL
Qty: 30 TABLET | OUTPATIENT
Start: 2022-11-14

## 2022-12-19 ENCOUNTER — OFFICE VISIT (OUTPATIENT)
Dept: OBSTETRICS AND GYNECOLOGY | Facility: CLINIC | Age: 52
End: 2022-12-19
Payer: MEDICAID

## 2022-12-19 VITALS
BODY MASS INDEX: 17.71 KG/M2 | HEART RATE: 86 BPM | WEIGHT: 100 LBS | DIASTOLIC BLOOD PRESSURE: 69 MMHG | SYSTOLIC BLOOD PRESSURE: 120 MMHG

## 2022-12-19 DIAGNOSIS — R39.9 UTI SYMPTOMS: Primary | ICD-10-CM

## 2022-12-19 PROCEDURE — 3044F HG A1C LEVEL LT 7.0%: CPT | Mod: CPTII,S$GLB,, | Performed by: STUDENT IN AN ORGANIZED HEALTH CARE EDUCATION/TRAINING PROGRAM

## 2022-12-19 PROCEDURE — 3078F PR MOST RECENT DIASTOLIC BLOOD PRESSURE < 80 MM HG: ICD-10-PCS | Mod: CPTII,S$GLB,, | Performed by: STUDENT IN AN ORGANIZED HEALTH CARE EDUCATION/TRAINING PROGRAM

## 2022-12-19 PROCEDURE — 3074F SYST BP LT 130 MM HG: CPT | Mod: CPTII,S$GLB,, | Performed by: STUDENT IN AN ORGANIZED HEALTH CARE EDUCATION/TRAINING PROGRAM

## 2022-12-19 PROCEDURE — 3078F DIAST BP <80 MM HG: CPT | Mod: CPTII,S$GLB,, | Performed by: STUDENT IN AN ORGANIZED HEALTH CARE EDUCATION/TRAINING PROGRAM

## 2022-12-19 PROCEDURE — 1160F RVW MEDS BY RX/DR IN RCRD: CPT | Mod: CPTII,S$GLB,, | Performed by: STUDENT IN AN ORGANIZED HEALTH CARE EDUCATION/TRAINING PROGRAM

## 2022-12-19 PROCEDURE — 99213 PR OFFICE/OUTPT VISIT, EST, LEVL III, 20-29 MIN: ICD-10-PCS | Mod: S$GLB,,, | Performed by: STUDENT IN AN ORGANIZED HEALTH CARE EDUCATION/TRAINING PROGRAM

## 2022-12-19 PROCEDURE — 3044F PR MOST RECENT HEMOGLOBIN A1C LEVEL <7.0%: ICD-10-PCS | Mod: CPTII,S$GLB,, | Performed by: STUDENT IN AN ORGANIZED HEALTH CARE EDUCATION/TRAINING PROGRAM

## 2022-12-19 PROCEDURE — 1160F PR REVIEW ALL MEDS BY PRESCRIBER/CLIN PHARMACIST DOCUMENTED: ICD-10-PCS | Mod: CPTII,S$GLB,, | Performed by: STUDENT IN AN ORGANIZED HEALTH CARE EDUCATION/TRAINING PROGRAM

## 2022-12-19 PROCEDURE — 3008F BODY MASS INDEX DOCD: CPT | Mod: CPTII,S$GLB,, | Performed by: STUDENT IN AN ORGANIZED HEALTH CARE EDUCATION/TRAINING PROGRAM

## 2022-12-19 PROCEDURE — 1159F PR MEDICATION LIST DOCUMENTED IN MEDICAL RECORD: ICD-10-PCS | Mod: CPTII,S$GLB,, | Performed by: STUDENT IN AN ORGANIZED HEALTH CARE EDUCATION/TRAINING PROGRAM

## 2022-12-19 PROCEDURE — 3008F PR BODY MASS INDEX (BMI) DOCUMENTED: ICD-10-PCS | Mod: CPTII,S$GLB,, | Performed by: STUDENT IN AN ORGANIZED HEALTH CARE EDUCATION/TRAINING PROGRAM

## 2022-12-19 PROCEDURE — 3074F PR MOST RECENT SYSTOLIC BLOOD PRESSURE < 130 MM HG: ICD-10-PCS | Mod: CPTII,S$GLB,, | Performed by: STUDENT IN AN ORGANIZED HEALTH CARE EDUCATION/TRAINING PROGRAM

## 2022-12-19 PROCEDURE — 99213 OFFICE O/P EST LOW 20 MIN: CPT | Mod: S$GLB,,, | Performed by: STUDENT IN AN ORGANIZED HEALTH CARE EDUCATION/TRAINING PROGRAM

## 2022-12-19 PROCEDURE — 1159F MED LIST DOCD IN RCRD: CPT | Mod: CPTII,S$GLB,, | Performed by: STUDENT IN AN ORGANIZED HEALTH CARE EDUCATION/TRAINING PROGRAM

## 2022-12-19 NOTE — PROGRESS NOTES
Chief Complaint: possible UTI    HPI: Patient is a 52 y.o. y.o. female G0 who presents to GYN clinic for possible UTI.  Patient reports is making she had a UTI recently.  She denies any dysuria, frequency, urgency.  UA today in clinic was negative.  Patient was given reassurance today.  She is no other complaints at this time.  Review of systems negative    Physical Exam:  Vitals:    12/19/22 1400   BP: 120/69   Pulse: 86   Weight: 45.4 kg (100 lb)   Body mass index is 17.71 kg/m².    Gen: NAD, well developed, well nourished  Psych: alert and oriented x 3, normal affect  HEENT: normocephalic, atraumatic  Abd: soft, non-tender, non-distended  Skin: warm and dry  Ext: no c/c/c, moves all extremities  Neurological: normal gait, gross motor function intact    Results:  UA negative     Assessment: Patient is a 52 y.o. y.o. female G0 with  Patient Active Problem List   Diagnosis    Hx of appendectomy    Family history of colon cancer - multiple family members, reports genetic testing performed     H/O dilation and curettage x 2 for SAB    Anxiety and depression    S/P laparoscopic hysterectomy    S/P right oophorectomy    Reactive depression     Plan:  UA today negative in clinic   Patient given reassurance   Patient begins to have some UTI she may return to clinic for urine specimen   Return to clinic [email or web address].    Shahid Oreilly MD

## 2023-02-01 ENCOUNTER — PATIENT MESSAGE (OUTPATIENT)
Dept: FAMILY MEDICINE | Facility: CLINIC | Age: 53
End: 2023-02-01
Payer: MEDICAID

## 2023-02-13 ENCOUNTER — PATIENT MESSAGE (OUTPATIENT)
Dept: PRIMARY CARE CLINIC | Facility: CLINIC | Age: 53
End: 2023-02-13
Payer: MEDICAID

## 2023-03-30 ENCOUNTER — OFFICE VISIT (OUTPATIENT)
Dept: OBSTETRICS AND GYNECOLOGY | Facility: CLINIC | Age: 53
End: 2023-03-30
Payer: MEDICAID

## 2023-03-30 VITALS
BODY MASS INDEX: 16.83 KG/M2 | DIASTOLIC BLOOD PRESSURE: 60 MMHG | HEART RATE: 88 BPM | WEIGHT: 95 LBS | SYSTOLIC BLOOD PRESSURE: 116 MMHG

## 2023-03-30 DIAGNOSIS — Z12.31 ENCOUNTER FOR MAMMOGRAM TO ESTABLISH BASELINE MAMMOGRAM: ICD-10-CM

## 2023-03-30 DIAGNOSIS — Z01.419 WELL WOMAN EXAM WITH ROUTINE GYNECOLOGICAL EXAM: Primary | ICD-10-CM

## 2023-03-30 DIAGNOSIS — Z12.4 SCREENING FOR MALIGNANT NEOPLASM OF THE CERVIX: ICD-10-CM

## 2023-03-30 DIAGNOSIS — N93.0 PCB (POST COITAL BLEEDING): ICD-10-CM

## 2023-03-30 PROCEDURE — 1159F MED LIST DOCD IN RCRD: CPT | Mod: CPTII,S$GLB,, | Performed by: STUDENT IN AN ORGANIZED HEALTH CARE EDUCATION/TRAINING PROGRAM

## 2023-03-30 PROCEDURE — 3008F PR BODY MASS INDEX (BMI) DOCUMENTED: ICD-10-PCS | Mod: CPTII,S$GLB,, | Performed by: STUDENT IN AN ORGANIZED HEALTH CARE EDUCATION/TRAINING PROGRAM

## 2023-03-30 PROCEDURE — 1160F RVW MEDS BY RX/DR IN RCRD: CPT | Mod: CPTII,S$GLB,, | Performed by: STUDENT IN AN ORGANIZED HEALTH CARE EDUCATION/TRAINING PROGRAM

## 2023-03-30 PROCEDURE — 3008F BODY MASS INDEX DOCD: CPT | Mod: CPTII,S$GLB,, | Performed by: STUDENT IN AN ORGANIZED HEALTH CARE EDUCATION/TRAINING PROGRAM

## 2023-03-30 PROCEDURE — 3074F SYST BP LT 130 MM HG: CPT | Mod: CPTII,S$GLB,, | Performed by: STUDENT IN AN ORGANIZED HEALTH CARE EDUCATION/TRAINING PROGRAM

## 2023-03-30 PROCEDURE — 99396 PREV VISIT EST AGE 40-64: CPT | Mod: S$GLB,,, | Performed by: STUDENT IN AN ORGANIZED HEALTH CARE EDUCATION/TRAINING PROGRAM

## 2023-03-30 PROCEDURE — 1159F PR MEDICATION LIST DOCUMENTED IN MEDICAL RECORD: ICD-10-PCS | Mod: CPTII,S$GLB,, | Performed by: STUDENT IN AN ORGANIZED HEALTH CARE EDUCATION/TRAINING PROGRAM

## 2023-03-30 PROCEDURE — 1160F PR REVIEW ALL MEDS BY PRESCRIBER/CLIN PHARMACIST DOCUMENTED: ICD-10-PCS | Mod: CPTII,S$GLB,, | Performed by: STUDENT IN AN ORGANIZED HEALTH CARE EDUCATION/TRAINING PROGRAM

## 2023-03-30 PROCEDURE — 3078F PR MOST RECENT DIASTOLIC BLOOD PRESSURE < 80 MM HG: ICD-10-PCS | Mod: CPTII,S$GLB,, | Performed by: STUDENT IN AN ORGANIZED HEALTH CARE EDUCATION/TRAINING PROGRAM

## 2023-03-30 PROCEDURE — 99396 PR PREVENTIVE VISIT,EST,40-64: ICD-10-PCS | Mod: S$GLB,,, | Performed by: STUDENT IN AN ORGANIZED HEALTH CARE EDUCATION/TRAINING PROGRAM

## 2023-03-30 PROCEDURE — 3078F DIAST BP <80 MM HG: CPT | Mod: CPTII,S$GLB,, | Performed by: STUDENT IN AN ORGANIZED HEALTH CARE EDUCATION/TRAINING PROGRAM

## 2023-03-30 PROCEDURE — 3074F PR MOST RECENT SYSTOLIC BLOOD PRESSURE < 130 MM HG: ICD-10-PCS | Mod: CPTII,S$GLB,, | Performed by: STUDENT IN AN ORGANIZED HEALTH CARE EDUCATION/TRAINING PROGRAM

## 2023-03-30 NOTE — PROGRESS NOTES
Chief Complaint: Annual Exam    HPI: 53 y.o.  here for Annual Exam.  Patient doing well today.  She reports after intercourse she noted some light spotting 1-2 days.  She also reports some discomfort with intercourse as well.  She has no other complaints today.    Review of Systems   Constitutional:  Negative for chills and fever.   HENT:  Negative for congestion and sore throat.    Respiratory:  Negative for cough and shortness of breath.    Cardiovascular:  Negative for chest pain and palpitations.   Gastrointestinal:  Negative for abdominal pain, nausea and vomiting.   Genitourinary:  Negative for dysuria, frequency and urgency.   Musculoskeletal:  Negative for back pain.   Skin:  Negative for itching and rash.   Neurological:  Negative for headaches.   All other systems reviewed and are negative.    PMH: denies  PSH: appendectomy, D/c, hysterectomy, right nephrectomy  Meds: none  NKDA  Obhx: , SAB x2, - D/c x2  GYNhx: denies abnormal pap smears, denies STD's  Social hx: + tob use, denies d/e  Family hx: + colon hx in father, brother, and paternal aunts, denies breast, ovarian, or uterine cancer    Physical Exam:  Vitals:    23 1120   BP: 116/60   Pulse: 88     Body mass index is 16.83 kg/m².  Physical Exam  Vitals reviewed. Exam conducted with a chaperone present.   Constitutional:       General: She is not in acute distress.     Appearance: Normal appearance. She is normal weight.   HENT:      Head: Normocephalic and atraumatic.   Eyes:      Extraocular Movements: Extraocular movements intact.      Pupils: Pupils are equal, round, and reactive to light.   Pulmonary:      Effort: Pulmonary effort is normal. No respiratory distress.   Chest:   Breasts:     Breasts are symmetrical.      Right: No inverted nipple, mass, nipple discharge, skin change or tenderness.      Left: No inverted nipple, mass, nipple discharge, skin change or tenderness.   Abdominal:      General: There is no distension.       Palpations: Abdomen is soft.      Tenderness: There is no abdominal tenderness.   Genitourinary:     Comments: Normal external female genitalia, no masses or lesions, vagina pink, no vaginal bleeding discharge, vaginal cuff intact  Musculoskeletal:         General: No swelling or tenderness. Normal range of motion.      Cervical back: Normal range of motion and neck supple.   Lymphadenopathy:      Upper Body:      Right upper body: No supraclavicular or axillary adenopathy.      Left upper body: No supraclavicular or axillary adenopathy.   Skin:     General: Skin is warm and dry.   Neurological:      General: No focal deficit present.      Mental Status: She is alert and oriented to person, place, and time.        ASSESSMENT:   Patient is a 53 y.o.  who presents for annual exam     Patient Active Problem List   Diagnosis    Hx of appendectomy    Family history of colon cancer - multiple family members, reports genetic testing performed     H/O dilation and curettage x 2 for SAB    Anxiety and depression    S/P laparoscopic hysterectomy    S/P right oophorectomy    Reactive depression       PLAN:  Pap not indicated  Mammogram - ordered   Patient reassurance no bleeding was noted on exam today, likely vaginal atrophy  Patient encouraged to use lubricants as needed for intercourse, bleeding continues patient to notify clinic  We discussed using Estrace cream if needed  TSH, Lipid profile, Diabetes screening, Colon cancer screening followed by PCP  Counseling on self-breast exams, diet, and exercise.  RTC in 1 yr for annual or sooner if needed

## 2023-04-20 ENCOUNTER — OFFICE VISIT (OUTPATIENT)
Dept: OBSTETRICS AND GYNECOLOGY | Facility: CLINIC | Age: 53
End: 2023-04-20
Payer: MEDICAID

## 2023-04-20 VITALS
WEIGHT: 93 LBS | HEART RATE: 87 BPM | DIASTOLIC BLOOD PRESSURE: 70 MMHG | SYSTOLIC BLOOD PRESSURE: 110 MMHG | BODY MASS INDEX: 16.47 KG/M2

## 2023-04-20 DIAGNOSIS — N93.9 VAGINAL BLEEDING: ICD-10-CM

## 2023-04-20 DIAGNOSIS — N39.0 RECURRENT UTI: Primary | ICD-10-CM

## 2023-04-20 PROCEDURE — 3074F SYST BP LT 130 MM HG: CPT | Mod: CPTII,S$GLB,, | Performed by: STUDENT IN AN ORGANIZED HEALTH CARE EDUCATION/TRAINING PROGRAM

## 2023-04-20 PROCEDURE — 99213 OFFICE O/P EST LOW 20 MIN: CPT | Mod: S$GLB,,, | Performed by: STUDENT IN AN ORGANIZED HEALTH CARE EDUCATION/TRAINING PROGRAM

## 2023-04-20 PROCEDURE — 1160F RVW MEDS BY RX/DR IN RCRD: CPT | Mod: CPTII,S$GLB,, | Performed by: STUDENT IN AN ORGANIZED HEALTH CARE EDUCATION/TRAINING PROGRAM

## 2023-04-20 PROCEDURE — 1159F MED LIST DOCD IN RCRD: CPT | Mod: CPTII,S$GLB,, | Performed by: STUDENT IN AN ORGANIZED HEALTH CARE EDUCATION/TRAINING PROGRAM

## 2023-04-20 PROCEDURE — 3008F BODY MASS INDEX DOCD: CPT | Mod: CPTII,S$GLB,, | Performed by: STUDENT IN AN ORGANIZED HEALTH CARE EDUCATION/TRAINING PROGRAM

## 2023-04-20 PROCEDURE — 99213 PR OFFICE/OUTPT VISIT, EST, LEVL III, 20-29 MIN: ICD-10-PCS | Mod: S$GLB,,, | Performed by: STUDENT IN AN ORGANIZED HEALTH CARE EDUCATION/TRAINING PROGRAM

## 2023-04-20 PROCEDURE — 1160F PR REVIEW ALL MEDS BY PRESCRIBER/CLIN PHARMACIST DOCUMENTED: ICD-10-PCS | Mod: CPTII,S$GLB,, | Performed by: STUDENT IN AN ORGANIZED HEALTH CARE EDUCATION/TRAINING PROGRAM

## 2023-04-20 PROCEDURE — 3008F PR BODY MASS INDEX (BMI) DOCUMENTED: ICD-10-PCS | Mod: CPTII,S$GLB,, | Performed by: STUDENT IN AN ORGANIZED HEALTH CARE EDUCATION/TRAINING PROGRAM

## 2023-04-20 PROCEDURE — 1159F PR MEDICATION LIST DOCUMENTED IN MEDICAL RECORD: ICD-10-PCS | Mod: CPTII,S$GLB,, | Performed by: STUDENT IN AN ORGANIZED HEALTH CARE EDUCATION/TRAINING PROGRAM

## 2023-04-20 PROCEDURE — 3078F DIAST BP <80 MM HG: CPT | Mod: CPTII,S$GLB,, | Performed by: STUDENT IN AN ORGANIZED HEALTH CARE EDUCATION/TRAINING PROGRAM

## 2023-04-20 PROCEDURE — 3078F PR MOST RECENT DIASTOLIC BLOOD PRESSURE < 80 MM HG: ICD-10-PCS | Mod: CPTII,S$GLB,, | Performed by: STUDENT IN AN ORGANIZED HEALTH CARE EDUCATION/TRAINING PROGRAM

## 2023-04-20 PROCEDURE — 3074F PR MOST RECENT SYSTOLIC BLOOD PRESSURE < 130 MM HG: ICD-10-PCS | Mod: CPTII,S$GLB,, | Performed by: STUDENT IN AN ORGANIZED HEALTH CARE EDUCATION/TRAINING PROGRAM

## 2023-04-20 NOTE — PROGRESS NOTES
Chief Complaint: bleeding, recurrent UTIs    HPI: Patient is a 53 y.o. y.o. female G0 who presents to GYN clinic for follow up.  Patient reports occasional episodes possible vaginal bleeding along with history of recurrent UTIs.  She reports going to urgent care several times since her last visit being treated with antibiotics.  Denies any history of kidney stones hemorrhoids.  Patient did have a total laparoscopic hysterectomy in the past.  She denies any inciting events that lead to her bleeding, denies any bleeding following intercourse.  She reports having a colonoscopy performed last year was normal.  She has no other complaints today.  Review of systems negative unless mentioned above..    Physical Exam:  Vitals:    04/20/23 1106   BP: 110/70   Pulse: 87   Weight: 42.2 kg (93 lb)   \Body mass index is 16.47 kg/m².    Gen: NAD, well developed, well nourished  Psych: alert and oriented x 3, normal affect  HEENT: normocephalic, atraumatic  Abd: soft, non-tender, non-distended  Pelvic:  Normal external female genitalia, no masses or lesions, vagina pink, no vaginal bleeding or discharge, no source of bleeding identified on vaginal exam  Skin:  Warm and dry  Ext: no c/c/c, moves all extremities  Neurological: normal gait, gross motor function intact    Assessment: Patient is a 53 y.o. y.o. female G0 with  Patient Active Problem List   Diagnosis    Hx of appendectomy    Family history of colon cancer - multiple family members, reports genetic testing performed     H/O dilation and curettage x 2 for SAB    Anxiety and depression    S/P laparoscopic hysterectomy    S/P right oophorectomy    Reactive depression       Plan:  Patient given reassurance today that no source of bleeding could be found on her vaginal exam today   Patient does have UTI she was seen several times at urgent care  Will refer to Urology for further evaluation  UA today  Patient notices vaginal bleeding, patient may reschedule appointment for  exam  Return to clinic moise.    Shahid Oreilly MD

## 2023-05-23 ENCOUNTER — OFFICE VISIT (OUTPATIENT)
Dept: UROLOGY | Facility: CLINIC | Age: 53
End: 2023-05-23
Payer: MEDICAID

## 2023-05-23 VITALS — SYSTOLIC BLOOD PRESSURE: 103 MMHG | DIASTOLIC BLOOD PRESSURE: 61 MMHG | HEART RATE: 84 BPM

## 2023-05-23 DIAGNOSIS — N39.0 RECURRENT UTI: Primary | ICD-10-CM

## 2023-05-23 LAB
BILIRUBIN, UA POC OHS: NEGATIVE
BLOOD, UA POC OHS: NEGATIVE
CLARITY, UA POC OHS: CLEAR
COLOR, UA POC OHS: YELLOW
GLUCOSE, UA POC OHS: NEGATIVE
KETONES, UA POC OHS: NEGATIVE
LEUKOCYTES, UA POC OHS: NEGATIVE
NITRITE, UA POC OHS: NEGATIVE
PH, UA POC OHS: 7
PROTEIN, UA POC OHS: NEGATIVE
SPECIFIC GRAVITY, UA POC OHS: 1.01
UROBILINOGEN, UA POC OHS: 0.2

## 2023-05-23 PROCEDURE — 3074F PR MOST RECENT SYSTOLIC BLOOD PRESSURE < 130 MM HG: ICD-10-PCS | Mod: CPTII,S$GLB,, | Performed by: NURSE PRACTITIONER

## 2023-05-23 PROCEDURE — 1160F PR REVIEW ALL MEDS BY PRESCRIBER/CLIN PHARMACIST DOCUMENTED: ICD-10-PCS | Mod: CPTII,S$GLB,, | Performed by: NURSE PRACTITIONER

## 2023-05-23 PROCEDURE — 99204 OFFICE O/P NEW MOD 45 MIN: CPT | Mod: S$GLB,,, | Performed by: NURSE PRACTITIONER

## 2023-05-23 PROCEDURE — 81003 URINALYSIS AUTO W/O SCOPE: CPT | Mod: QW,S$GLB,, | Performed by: NURSE PRACTITIONER

## 2023-05-23 PROCEDURE — 3078F PR MOST RECENT DIASTOLIC BLOOD PRESSURE < 80 MM HG: ICD-10-PCS | Mod: CPTII,S$GLB,, | Performed by: NURSE PRACTITIONER

## 2023-05-23 PROCEDURE — 1160F RVW MEDS BY RX/DR IN RCRD: CPT | Mod: CPTII,S$GLB,, | Performed by: NURSE PRACTITIONER

## 2023-05-23 PROCEDURE — 3078F DIAST BP <80 MM HG: CPT | Mod: CPTII,S$GLB,, | Performed by: NURSE PRACTITIONER

## 2023-05-23 PROCEDURE — 1159F PR MEDICATION LIST DOCUMENTED IN MEDICAL RECORD: ICD-10-PCS | Mod: CPTII,S$GLB,, | Performed by: NURSE PRACTITIONER

## 2023-05-23 PROCEDURE — 1159F MED LIST DOCD IN RCRD: CPT | Mod: CPTII,S$GLB,, | Performed by: NURSE PRACTITIONER

## 2023-05-23 PROCEDURE — 81003 POCT URINALYSIS(INSTRUMENT): ICD-10-PCS | Mod: QW,S$GLB,, | Performed by: NURSE PRACTITIONER

## 2023-05-23 PROCEDURE — 3074F SYST BP LT 130 MM HG: CPT | Mod: CPTII,S$GLB,, | Performed by: NURSE PRACTITIONER

## 2023-05-23 PROCEDURE — 99204 PR OFFICE/OUTPT VISIT, NEW, LEVL IV, 45-59 MIN: ICD-10-PCS | Mod: S$GLB,,, | Performed by: NURSE PRACTITIONER

## 2023-05-23 NOTE — PROGRESS NOTES
Subjective:       Patient ID: Cherelle Marquis is a 53 y.o. female.    Chief Complaint: Establish Care (Recurrent UTI)      HPI: 53-year-old female, new to [facility], referred for recurring UTIs.    Patient states that she had been having recurring UTIs for about 8 months since she is had her partial hysterectomy.    States she will typically have pain with urination and episodes of frequency.    She is been treated multiple times with antibiotics.  States she normally goes to an urgent care in his put on antibiotics.  She does not know if any urine cultures have been done.  She is never been contacted with urine culture results.      At this time patient is doing well no complaints.  She denies any pain or burning urination.  Denies any odor urine.  Denies any fever or body aches.    Denies any significant frequency or urgency.    No other urinary complaints.       Past Medical History: History reviewed. No pertinent past medical history.    Past Surgical Historical:   Past Surgical History:   Procedure Laterality Date    APPENDECTOMY      TOTAL ABDOMINAL HYSTERECTOMY W/ BILATERAL SALPINGOOPHORECTOMY          Medications:   Medication List with Changes/Refills   Current Medications    ALPRAZOLAM (XANAX) 0.5 MG TABLET    Take 1 tablet (0.5 mg total) by mouth daily as needed for Anxiety.    AMITRIPTYLINE (ELAVIL) 10 MG TABLET    TAKE 1 TABLET(10 MG) BY MOUTH EVERY NIGHT AS NEEDED FOR INSOMNIA    ESCITALOPRAM OXALATE (LEXAPRO) 20 MG TABLET    TAKE 1 TABLET(20 MG) BY MOUTH EVERY DAY    HYDROCODONE-ACETAMINOPHEN (NORCO) 5-325 MG PER TABLET    Take 1 tablet by mouth every 6 (six) hours as needed.    NAPROXEN (NAPROSYN) 500 MG TABLET    Take 1 tablet (500 mg total) by mouth 2 (two) times daily.    NITROFURANTOIN, MACROCRYSTAL-MONOHYDRATE, (MACROBID) 100 MG CAPSULE    Take 100 mg by mouth 2 (two) times daily.    OXYCODONE-ACETAMINOPHEN (PERCOCET) 7.5-325 MG PER TABLET    Take 1 tablet by mouth every 8 (eight) hours  as needed for Pain.    PHENAZOPYRIDINE (PYRIDIUM) 100 MG TABLET        ROSUVASTATIN (CRESTOR) 10 MG TABLET    Take 10 mg by mouth once daily.        Past Social History:   Social History     Socioeconomic History    Marital status:    Tobacco Use    Smoking status: Every Day     Types: Cigarettes    Smokeless tobacco: Never   Substance and Sexual Activity    Alcohol use: Not Currently     Comment: former drinker     Drug use: Not Currently       Allergies: Review of patient's allergies indicates:  No Known Allergies     Family History:   Family History   Problem Relation Age of Onset    Cancer Father     Asthma Brother     Colon cancer Brother     Colon cancer Paternal Grandmother         Review of Systems:  Review of Systems   Constitutional:  Negative for activity change and appetite change.   HENT:  Negative for congestion and dental problem.    Respiratory:  Negative for chest tightness and shortness of breath.    Cardiovascular:  Negative for chest pain.   Gastrointestinal:  Negative for abdominal distention and abdominal pain.   Genitourinary:  Negative for decreased urine volume, difficulty urinating, dyspareunia, dysuria, enuresis, flank pain, frequency, genital sores, hematuria, pelvic pain and urgency.   Musculoskeletal:  Negative for back pain and neck pain.   Allergic/Immunologic: Negative for immunocompromised state.   Neurological:  Negative for dizziness.   Hematological:  Negative for adenopathy.   Psychiatric/Behavioral:  Negative for agitation, behavioral problems and confusion.      Physical Exam:  Physical Exam  Vitals and nursing note reviewed.   Constitutional:       Appearance: She is well-developed.   HENT:      Head: Normocephalic.   Eyes:      Pupils: Pupils are equal, round, and reactive to light.   Cardiovascular:      Rate and Rhythm: Normal rate and regular rhythm.      Heart sounds: Normal heart sounds.   Pulmonary:      Effort: Pulmonary effort is normal.      Breath sounds:  Normal breath sounds.   Abdominal:      General: Bowel sounds are normal.      Palpations: Abdomen is soft.   Musculoskeletal:         General: Normal range of motion.      Cervical back: Normal range of motion and neck supple.   Skin:     General: Skin is warm and dry.   Neurological:      Mental Status: She is alert and oriented to person, place, and time.   Psychiatric:         Mood and Affect: Mood normal.         Behavior: Behavior normal.     Urinalysis: Normal     Assessment/Plan:   Recurring UTI:  Urinalysis is normal today with no signs of infection.  Discussed possible causes of recurring UTIs.  Will schedule patient for renal ultrasound.    Patient encouraged to maintain hydration.  She admits to drinking about 7 cups of coffee per day.  Patient encouraged to decrease her coffee consumption.    Discussed drop off urine with the patient.  Patient stated understanding.    Follow-up to arrange pending ultrasound.  Problem List Items Addressed This Visit    None  Visit Diagnoses       Recurrent UTI    -  Primary    Relevant Orders    POCT Urinalysis(Instrument)    US Retroperitoneal Complete

## 2023-10-10 ENCOUNTER — TELEPHONE (OUTPATIENT)
Dept: UROLOGY | Facility: CLINIC | Age: 53
End: 2023-10-10
Payer: MEDICAID